# Patient Record
Sex: MALE | Race: WHITE | Employment: OTHER | URBAN - NONMETROPOLITAN AREA
[De-identification: names, ages, dates, MRNs, and addresses within clinical notes are randomized per-mention and may not be internally consistent; named-entity substitution may affect disease eponyms.]

---

## 2017-03-14 ENCOUNTER — OFFICE VISIT (OUTPATIENT)
Dept: CARDIOLOGY | Age: 72
End: 2017-03-14
Payer: MEDICARE

## 2017-03-14 VITALS
DIASTOLIC BLOOD PRESSURE: 70 MMHG | HEIGHT: 72 IN | BODY MASS INDEX: 28.44 KG/M2 | WEIGHT: 210 LBS | SYSTOLIC BLOOD PRESSURE: 138 MMHG | HEART RATE: 64 BPM

## 2017-03-14 DIAGNOSIS — I48.0 PAROXYSMAL ATRIAL FIBRILLATION (HCC): ICD-10-CM

## 2017-03-14 DIAGNOSIS — I49.5 SINOATRIAL NODE DYSFUNCTION (HCC): ICD-10-CM

## 2017-03-14 DIAGNOSIS — I48.0 PAF (PAROXYSMAL ATRIAL FIBRILLATION) (HCC): Primary | ICD-10-CM

## 2017-03-14 PROCEDURE — G8484 FLU IMMUNIZE NO ADMIN: HCPCS | Performed by: INTERNAL MEDICINE

## 2017-03-14 PROCEDURE — 1123F ACP DISCUSS/DSCN MKR DOCD: CPT | Performed by: INTERNAL MEDICINE

## 2017-03-14 PROCEDURE — 99212 OFFICE O/P EST SF 10 MIN: CPT | Performed by: INTERNAL MEDICINE

## 2017-03-14 PROCEDURE — G8420 CALC BMI NORM PARAMETERS: HCPCS | Performed by: INTERNAL MEDICINE

## 2017-03-14 PROCEDURE — 3017F COLORECTAL CA SCREEN DOC REV: CPT | Performed by: INTERNAL MEDICINE

## 2017-03-14 PROCEDURE — 4040F PNEUMOC VAC/ADMIN/RCVD: CPT | Performed by: INTERNAL MEDICINE

## 2017-03-14 PROCEDURE — 1036F TOBACCO NON-USER: CPT | Performed by: INTERNAL MEDICINE

## 2017-03-14 PROCEDURE — G8427 DOCREV CUR MEDS BY ELIG CLIN: HCPCS | Performed by: INTERNAL MEDICINE

## 2017-03-14 RX ORDER — RISPERIDONE 0.5 MG/1
0.5 TABLET, FILM COATED ORAL DAILY
COMMUNITY
Start: 2017-03-01 | End: 2018-03-21 | Stop reason: ALTCHOICE

## 2017-03-14 RX ORDER — CITALOPRAM 20 MG/1
20 TABLET ORAL DAILY
COMMUNITY
Start: 2017-03-01 | End: 2018-03-21 | Stop reason: ALTCHOICE

## 2017-05-17 ENCOUNTER — TELEPHONE (OUTPATIENT)
Dept: CARDIOLOGY | Age: 72
End: 2017-05-17

## 2017-06-13 DIAGNOSIS — I48.92 ATRIAL FLUTTER, UNSPECIFIED TYPE (HCC): Primary | ICD-10-CM

## 2017-06-13 RX ORDER — PROPAFENONE HYDROCHLORIDE 300 MG/1
300 TABLET, COATED ORAL EVERY 8 HOURS
Qty: 90 TABLET | Refills: 5 | Status: SHIPPED | OUTPATIENT
Start: 2017-06-13 | End: 2017-12-20 | Stop reason: SDUPTHER

## 2017-07-05 ENCOUNTER — TRANSCRIBE ORDERS (OUTPATIENT)
Dept: ADMINISTRATIVE | Facility: HOSPITAL | Age: 72
End: 2017-07-05

## 2017-07-05 ENCOUNTER — APPOINTMENT (OUTPATIENT)
Dept: LAB | Facility: HOSPITAL | Age: 72
End: 2017-07-05
Attending: FAMILY MEDICINE

## 2017-07-05 DIAGNOSIS — Z51.89 THERAPEUTIC: ICD-10-CM

## 2017-07-05 DIAGNOSIS — D69.1 ABNORMAL PLATELETS (HCC): Primary | ICD-10-CM

## 2017-07-05 LAB
DEPRECATED RDW RBC AUTO: 46.2 FL (ref 40–54)
ERYTHROCYTE [DISTWIDTH] IN BLOOD BY AUTOMATED COUNT: 13.4 % (ref 12–15)
HCT VFR BLD AUTO: 48.6 % (ref 40–52)
HGB BLD-MCNC: 16.5 G/DL (ref 14–18)
MCH RBC QN AUTO: 32 PG (ref 28–32)
MCHC RBC AUTO-ENTMCNC: 34 G/DL (ref 33–36)
MCV RBC AUTO: 94.2 FL (ref 82–95)
PLATELET # BLD AUTO: 172 10*3/MM3 (ref 130–400)
PMV BLD AUTO: 9.9 FL (ref 6–12)
POST-BLOOD PRESSURE: NORMAL
PRE-BLOOD PRESSURE: NORMAL
PRE-HCT: 48.6
PRE-HGB: 16.5
PULSE: 50
RBC # BLD AUTO: 5.16 10*6/MM3 (ref 4.8–5.9)
VOLUME COLLECTED: NORMAL
WBC NRBC COR # BLD: 6.01 10*3/MM3 (ref 4.8–10.8)

## 2017-07-05 PROCEDURE — 36415 COLL VENOUS BLD VENIPUNCTURE: CPT | Performed by: FAMILY MEDICINE

## 2017-07-05 PROCEDURE — 99195 PHLEBOTOMY: CPT | Performed by: FAMILY MEDICINE

## 2017-07-05 PROCEDURE — 85027 COMPLETE CBC AUTOMATED: CPT | Performed by: FAMILY MEDICINE

## 2017-09-20 ENCOUNTER — OFFICE VISIT (OUTPATIENT)
Dept: CARDIOLOGY | Age: 72
End: 2017-09-20
Payer: MEDICARE

## 2017-09-20 VITALS
DIASTOLIC BLOOD PRESSURE: 82 MMHG | HEART RATE: 50 BPM | BODY MASS INDEX: 28.89 KG/M2 | WEIGHT: 218 LBS | HEIGHT: 73 IN | SYSTOLIC BLOOD PRESSURE: 128 MMHG

## 2017-09-20 DIAGNOSIS — E78.00 HYPERCHOLESTEREMIA: ICD-10-CM

## 2017-09-20 DIAGNOSIS — I48.0 PAF (PAROXYSMAL ATRIAL FIBRILLATION) (HCC): ICD-10-CM

## 2017-09-20 DIAGNOSIS — I49.5 SINOATRIAL NODE DYSFUNCTION (HCC): ICD-10-CM

## 2017-09-20 DIAGNOSIS — I48.92 ATRIAL FLUTTER, UNSPECIFIED TYPE (HCC): Primary | ICD-10-CM

## 2017-09-20 PROCEDURE — G8427 DOCREV CUR MEDS BY ELIG CLIN: HCPCS | Performed by: INTERNAL MEDICINE

## 2017-09-20 PROCEDURE — 4040F PNEUMOC VAC/ADMIN/RCVD: CPT | Performed by: INTERNAL MEDICINE

## 2017-09-20 PROCEDURE — 99213 OFFICE O/P EST LOW 20 MIN: CPT | Performed by: INTERNAL MEDICINE

## 2017-09-20 PROCEDURE — G8419 CALC BMI OUT NRM PARAM NOF/U: HCPCS | Performed by: INTERNAL MEDICINE

## 2017-09-20 PROCEDURE — 3017F COLORECTAL CA SCREEN DOC REV: CPT | Performed by: INTERNAL MEDICINE

## 2017-09-20 PROCEDURE — 1036F TOBACCO NON-USER: CPT | Performed by: INTERNAL MEDICINE

## 2017-09-20 PROCEDURE — 1123F ACP DISCUSS/DSCN MKR DOCD: CPT | Performed by: INTERNAL MEDICINE

## 2017-12-20 ENCOUNTER — TELEPHONE (OUTPATIENT)
Dept: CARDIOLOGY | Age: 72
End: 2017-12-20

## 2017-12-20 DIAGNOSIS — I48.92 ATRIAL FLUTTER, UNSPECIFIED TYPE (HCC): ICD-10-CM

## 2017-12-20 RX ORDER — PROPAFENONE HYDROCHLORIDE 300 MG/1
300 TABLET, COATED ORAL EVERY 8 HOURS
Qty: 90 TABLET | Refills: 5 | Status: SHIPPED | OUTPATIENT
Start: 2017-12-20 | End: 2018-05-21 | Stop reason: SDUPTHER

## 2018-01-16 DIAGNOSIS — I48.92 ATRIAL FLUTTER, UNSPECIFIED TYPE (HCC): Primary | ICD-10-CM

## 2018-03-21 ENCOUNTER — OFFICE VISIT (OUTPATIENT)
Dept: CARDIOLOGY | Age: 73
End: 2018-03-21
Payer: MEDICARE

## 2018-03-21 VITALS
HEIGHT: 73 IN | DIASTOLIC BLOOD PRESSURE: 72 MMHG | HEART RATE: 56 BPM | SYSTOLIC BLOOD PRESSURE: 128 MMHG | BODY MASS INDEX: 28.23 KG/M2 | WEIGHT: 213 LBS

## 2018-03-21 DIAGNOSIS — I48.0 PAF (PAROXYSMAL ATRIAL FIBRILLATION) (HCC): ICD-10-CM

## 2018-03-21 DIAGNOSIS — I48.92 ATRIAL FLUTTER, UNSPECIFIED TYPE (HCC): Primary | ICD-10-CM

## 2018-03-21 DIAGNOSIS — I49.5 SINOATRIAL NODE DYSFUNCTION (HCC): ICD-10-CM

## 2018-03-21 DIAGNOSIS — E78.00 HYPERCHOLESTEREMIA: ICD-10-CM

## 2018-03-21 PROCEDURE — G8484 FLU IMMUNIZE NO ADMIN: HCPCS | Performed by: INTERNAL MEDICINE

## 2018-03-21 PROCEDURE — G8427 DOCREV CUR MEDS BY ELIG CLIN: HCPCS | Performed by: INTERNAL MEDICINE

## 2018-03-21 PROCEDURE — 1123F ACP DISCUSS/DSCN MKR DOCD: CPT | Performed by: INTERNAL MEDICINE

## 2018-03-21 PROCEDURE — 3017F COLORECTAL CA SCREEN DOC REV: CPT | Performed by: INTERNAL MEDICINE

## 2018-03-21 PROCEDURE — G8419 CALC BMI OUT NRM PARAM NOF/U: HCPCS | Performed by: INTERNAL MEDICINE

## 2018-03-21 PROCEDURE — 4040F PNEUMOC VAC/ADMIN/RCVD: CPT | Performed by: INTERNAL MEDICINE

## 2018-03-21 PROCEDURE — 1036F TOBACCO NON-USER: CPT | Performed by: INTERNAL MEDICINE

## 2018-03-21 PROCEDURE — 99212 OFFICE O/P EST SF 10 MIN: CPT | Performed by: INTERNAL MEDICINE

## 2018-03-21 RX ORDER — LISINOPRIL 5 MG/1
5 TABLET ORAL DAILY
COMMUNITY
End: 2020-07-21

## 2018-05-21 DIAGNOSIS — I48.92 ATRIAL FLUTTER, UNSPECIFIED TYPE (HCC): ICD-10-CM

## 2018-05-21 RX ORDER — PROPAFENONE HYDROCHLORIDE 300 MG/1
300 TABLET, COATED ORAL EVERY 8 HOURS
Qty: 90 TABLET | Refills: 5 | Status: SHIPPED | OUTPATIENT
Start: 2018-05-21 | End: 2018-12-28 | Stop reason: SDUPTHER

## 2018-07-13 ENCOUNTER — TRANSCRIBE ORDERS (OUTPATIENT)
Dept: ADMINISTRATIVE | Facility: HOSPITAL | Age: 73
End: 2018-07-13

## 2018-07-13 DIAGNOSIS — Z13.6 ENCOUNTER FOR SCREENING FOR VASCULAR DISEASE: Primary | ICD-10-CM

## 2018-08-30 ENCOUNTER — HOSPITAL ENCOUNTER (OUTPATIENT)
Dept: ULTRASOUND IMAGING | Facility: HOSPITAL | Age: 73
Discharge: HOME | End: 2018-08-30
Admitting: FAMILY MEDICINE

## 2018-08-30 DIAGNOSIS — Z13.6 ENCOUNTER FOR SCREENING FOR VASCULAR DISEASE: ICD-10-CM

## 2018-08-30 PROCEDURE — 93799 UNLISTED CV SVC/PROCEDURE: CPT

## 2018-09-17 ENCOUNTER — TELEPHONE (OUTPATIENT)
Dept: CARDIOLOGY | Age: 73
End: 2018-09-17

## 2018-09-17 NOTE — TELEPHONE ENCOUNTER
MELISSA no longer seeing Pt's at [facility]. Called Pt and left message about appt on 9/26 needing to be rescheduled.  Pt will need to be rescheduled with Vern Son at [address] on 10/24

## 2018-10-26 ENCOUNTER — OFFICE VISIT (OUTPATIENT)
Dept: CARDIOLOGY | Age: 73
End: 2018-10-26
Payer: MEDICARE

## 2018-10-26 VITALS
HEIGHT: 73 IN | DIASTOLIC BLOOD PRESSURE: 64 MMHG | BODY MASS INDEX: 28.36 KG/M2 | SYSTOLIC BLOOD PRESSURE: 102 MMHG | WEIGHT: 214 LBS | HEART RATE: 48 BPM

## 2018-10-26 DIAGNOSIS — I25.10 CORONARY ARTERY DISEASE INVOLVING NATIVE CORONARY ARTERY OF NATIVE HEART WITHOUT ANGINA PECTORIS: ICD-10-CM

## 2018-10-26 DIAGNOSIS — I49.5 SINOATRIAL NODE DYSFUNCTION (HCC): ICD-10-CM

## 2018-10-26 DIAGNOSIS — R94.31 ABNORMAL EKG: Primary | ICD-10-CM

## 2018-10-26 DIAGNOSIS — I48.92 ATRIAL FLUTTER, UNSPECIFIED TYPE (HCC): ICD-10-CM

## 2018-10-26 DIAGNOSIS — E78.00 HYPERCHOLESTEREMIA: ICD-10-CM

## 2018-10-26 PROCEDURE — 4040F PNEUMOC VAC/ADMIN/RCVD: CPT | Performed by: NURSE PRACTITIONER

## 2018-10-26 PROCEDURE — G8598 ASA/ANTIPLAT THER USED: HCPCS | Performed by: NURSE PRACTITIONER

## 2018-10-26 PROCEDURE — 99213 OFFICE O/P EST LOW 20 MIN: CPT | Performed by: NURSE PRACTITIONER

## 2018-10-26 PROCEDURE — 93000 ELECTROCARDIOGRAM COMPLETE: CPT | Performed by: NURSE PRACTITIONER

## 2018-10-26 PROCEDURE — 1036F TOBACCO NON-USER: CPT | Performed by: NURSE PRACTITIONER

## 2018-10-26 PROCEDURE — 1123F ACP DISCUSS/DSCN MKR DOCD: CPT | Performed by: NURSE PRACTITIONER

## 2018-10-26 PROCEDURE — 1101F PT FALLS ASSESS-DOCD LE1/YR: CPT | Performed by: NURSE PRACTITIONER

## 2018-10-26 PROCEDURE — 3017F COLORECTAL CA SCREEN DOC REV: CPT | Performed by: NURSE PRACTITIONER

## 2018-10-26 PROCEDURE — G8427 DOCREV CUR MEDS BY ELIG CLIN: HCPCS | Performed by: NURSE PRACTITIONER

## 2018-10-26 PROCEDURE — G8484 FLU IMMUNIZE NO ADMIN: HCPCS | Performed by: NURSE PRACTITIONER

## 2018-10-26 PROCEDURE — G8419 CALC BMI OUT NRM PARAM NOF/U: HCPCS | Performed by: NURSE PRACTITIONER

## 2018-10-26 RX ORDER — LEVOTHYROXINE SODIUM 0.03 MG/1
25 TABLET ORAL DAILY
COMMUNITY
End: 2018-12-07 | Stop reason: DRUGHIGH

## 2018-10-26 RX ORDER — RISPERIDONE 0.5 MG/1
0.5 TABLET, FILM COATED ORAL EVERY EVENING
COMMUNITY

## 2018-10-26 RX ORDER — CITALOPRAM 20 MG/1
20 TABLET ORAL DAILY
COMMUNITY
End: 2019-05-03 | Stop reason: DRUGHIGH

## 2018-10-26 RX ORDER — FENOFIBRATE 160 MG/1
160 TABLET ORAL DAILY
COMMUNITY

## 2018-10-26 NOTE — PROGRESS NOTES
auscultation bilaterally without evidence of respiratory distress. He without wheezes. He without rales or ronchi. Musculoskeletal: Normal range of motion. Gait is normal no assitive device. Neurological: He is alert and oriented to person, place, and time. Skin: Skin is warm and dry without rash or pallor. Psychiatric: He has a normal mood and affect. His behavior is normal. Thought content normal.     Lab Results   Component Value Date    CREATININE 1.4 06/17/2016    CREATININE 1.3 09/11/2015    CREATININE 1.4 05/19/2015    HGB 15.6 09/11/2015    HGB 17.6 02/19/2015       ECG 10/26/18  Sinus bradycardia, LBBB, 48 BPM    Assessment    1. Abnormal EKG    2. Hypercholesteremia    3. Sinoatrial node dysfunction (HCC)    4. Atrial flutter, unspecified type ([address]    5. Coronary artery disease involving native coronary artery of native heart without angina pectoris    6. Hypothyroidism / Goiter      Plan:    CAD-He is asymptomatic. He is able to meet METs. I have recommended he begin ASA 81 mg uses daily. I have discussed with the patient that he had diffuse CAD with 30% RCA stenosis on a cath from 2015 and has not been on ASA or statin and his EKG changes (compared to prior EKGs from 2016 and 2015) seem chronic. Difficult to tell as one was tachycardic with rates 124 and one was atrial fib at 110) I have therefore recommended he have a lexiscan nuclear stress test (has afib and LBBB). He has stated he will not have it done as he does not want to post pone his surgery on Monday. I have explained to he and his wife that the surgeon will be looking for our recommendations as they had reached out to us with concerns about his pre op EKG. Again, my recommendations are he has a lexiscan and that I would be putting this in my note to the surgeon. He understands. I advised I would be sending my note today and that I would also have Maryjane Genao, RN -risk stratification nurse reach out to their office.  I also advised

## 2018-11-30 ENCOUNTER — TELEPHONE (OUTPATIENT)
Dept: CARDIOLOGY | Age: 73
End: 2018-11-30

## 2018-12-07 ENCOUNTER — OFFICE VISIT (OUTPATIENT)
Dept: CARDIOLOGY | Age: 73
End: 2018-12-07
Payer: MEDICARE

## 2018-12-07 ENCOUNTER — TELEPHONE (OUTPATIENT)
Dept: CARDIOLOGY | Age: 73
End: 2018-12-07

## 2018-12-07 VITALS
HEIGHT: 73 IN | BODY MASS INDEX: 27.3 KG/M2 | DIASTOLIC BLOOD PRESSURE: 82 MMHG | SYSTOLIC BLOOD PRESSURE: 138 MMHG | HEART RATE: 58 BPM | WEIGHT: 206 LBS

## 2018-12-07 DIAGNOSIS — I49.5 SINOATRIAL NODE DYSFUNCTION (HCC): ICD-10-CM

## 2018-12-07 DIAGNOSIS — I25.10 CORONARY ARTERY DISEASE INVOLVING NATIVE CORONARY ARTERY OF NATIVE HEART WITHOUT ANGINA PECTORIS: ICD-10-CM

## 2018-12-07 DIAGNOSIS — I48.92 ATRIAL FLUTTER, UNSPECIFIED TYPE (HCC): Primary | ICD-10-CM

## 2018-12-07 DIAGNOSIS — E78.00 HYPERCHOLESTEREMIA: ICD-10-CM

## 2018-12-07 PROCEDURE — 93000 ELECTROCARDIOGRAM COMPLETE: CPT | Performed by: NURSE PRACTITIONER

## 2018-12-07 PROCEDURE — G8419 CALC BMI OUT NRM PARAM NOF/U: HCPCS | Performed by: NURSE PRACTITIONER

## 2018-12-07 PROCEDURE — G8427 DOCREV CUR MEDS BY ELIG CLIN: HCPCS | Performed by: NURSE PRACTITIONER

## 2018-12-07 PROCEDURE — G8484 FLU IMMUNIZE NO ADMIN: HCPCS | Performed by: NURSE PRACTITIONER

## 2018-12-07 PROCEDURE — 1036F TOBACCO NON-USER: CPT | Performed by: NURSE PRACTITIONER

## 2018-12-07 PROCEDURE — 4040F PNEUMOC VAC/ADMIN/RCVD: CPT | Performed by: NURSE PRACTITIONER

## 2018-12-07 PROCEDURE — 99213 OFFICE O/P EST LOW 20 MIN: CPT | Performed by: NURSE PRACTITIONER

## 2018-12-07 PROCEDURE — 3017F COLORECTAL CA SCREEN DOC REV: CPT | Performed by: NURSE PRACTITIONER

## 2018-12-07 PROCEDURE — 1101F PT FALLS ASSESS-DOCD LE1/YR: CPT | Performed by: NURSE PRACTITIONER

## 2018-12-07 PROCEDURE — 1123F ACP DISCUSS/DSCN MKR DOCD: CPT | Performed by: NURSE PRACTITIONER

## 2018-12-07 PROCEDURE — G8598 ASA/ANTIPLAT THER USED: HCPCS | Performed by: NURSE PRACTITIONER

## 2018-12-07 RX ORDER — LEVOTHYROXINE SODIUM 0.15 MG/1
150 TABLET ORAL DAILY
Refills: 1 | Status: ON HOLD | COMMUNITY
Start: 2018-11-27 | End: 2020-11-19

## 2018-12-28 DIAGNOSIS — I48.92 ATRIAL FLUTTER, UNSPECIFIED TYPE (HCC): ICD-10-CM

## 2018-12-28 RX ORDER — PROPAFENONE HYDROCHLORIDE 300 MG/1
TABLET, COATED ORAL
Qty: 90 TABLET | Refills: 5 | Status: SHIPPED | OUTPATIENT
Start: 2018-12-28 | End: 2019-07-15 | Stop reason: SDUPTHER

## 2019-01-24 RX ORDER — RIVAROXABAN 20 MG/1
TABLET, FILM COATED ORAL
Qty: 90 TABLET | Refills: 3 | Status: SHIPPED | OUTPATIENT
Start: 2019-01-24 | End: 2020-01-02

## 2019-05-03 ENCOUNTER — OFFICE VISIT (OUTPATIENT)
Dept: CARDIOLOGY | Age: 74
End: 2019-05-03
Payer: MEDICARE

## 2019-05-03 VITALS
BODY MASS INDEX: 28.89 KG/M2 | HEART RATE: 52 BPM | WEIGHT: 218 LBS | DIASTOLIC BLOOD PRESSURE: 86 MMHG | SYSTOLIC BLOOD PRESSURE: 130 MMHG | HEIGHT: 73 IN

## 2019-05-03 DIAGNOSIS — I25.10 CORONARY ARTERY DISEASE INVOLVING NATIVE CORONARY ARTERY OF NATIVE HEART WITHOUT ANGINA PECTORIS: ICD-10-CM

## 2019-05-03 DIAGNOSIS — I49.5 SINOATRIAL NODE DYSFUNCTION (HCC): ICD-10-CM

## 2019-05-03 DIAGNOSIS — I48.0 PAROXYSMAL ATRIAL FIBRILLATION (HCC): ICD-10-CM

## 2019-05-03 DIAGNOSIS — E78.00 HYPERCHOLESTEREMIA: Primary | ICD-10-CM

## 2019-05-03 PROCEDURE — G8427 DOCREV CUR MEDS BY ELIG CLIN: HCPCS | Performed by: NURSE PRACTITIONER

## 2019-05-03 PROCEDURE — G8419 CALC BMI OUT NRM PARAM NOF/U: HCPCS | Performed by: NURSE PRACTITIONER

## 2019-05-03 PROCEDURE — 1036F TOBACCO NON-USER: CPT | Performed by: NURSE PRACTITIONER

## 2019-05-03 PROCEDURE — 4040F PNEUMOC VAC/ADMIN/RCVD: CPT | Performed by: NURSE PRACTITIONER

## 2019-05-03 PROCEDURE — 99213 OFFICE O/P EST LOW 20 MIN: CPT | Performed by: NURSE PRACTITIONER

## 2019-05-03 PROCEDURE — 1123F ACP DISCUSS/DSCN MKR DOCD: CPT | Performed by: NURSE PRACTITIONER

## 2019-05-03 PROCEDURE — G8598 ASA/ANTIPLAT THER USED: HCPCS | Performed by: NURSE PRACTITIONER

## 2019-05-03 PROCEDURE — 3017F COLORECTAL CA SCREEN DOC REV: CPT | Performed by: NURSE PRACTITIONER

## 2019-05-03 PROCEDURE — 93000 ELECTROCARDIOGRAM COMPLETE: CPT | Performed by: NURSE PRACTITIONER

## 2019-05-03 RX ORDER — CITALOPRAM 40 MG/1
40 TABLET ORAL NIGHTLY
Refills: 1 | COMMUNITY
Start: 2019-03-05

## 2019-05-03 RX ORDER — AZELASTINE HCL 205.5 UG/1
2 SPRAY NASAL
COMMUNITY
End: 2019-11-07

## 2019-05-03 NOTE — PROGRESS NOTES
Dear Janel Flanagan Seen,    Thank you for allowing me to participate in the care of Mr. Rosetta Taylor. He presents today at the [address] in [place]. As you know, Mr. Carroll is a 68 y.o. male with history of hypothyroidism, CAD and PAF who presents with the chief complaint of 6 month follow up. He is a patient of Dr. Yessenia Christianson. CAD-last heart cath in 2015 with luminal irregularities throughout the entire coronary tree with 30% RCA stenosis. Offered Lexiscan prior to Thyroid surgery in October however patient refused. Surgery went well. EKG changes noted going back to 2016. PAF/ Sinoatrial node dysfunction-on Rythmol, Xarelto   3/12/2015  DCCV successful on lopressor 12.5 bid  05/04/2015  DCCV successful on rhythmol 150 tid  05/19/2015  DCCV successful on rhythmol 225 tid  6/16/2016  Recurrent atrial flutter  6/17/16  DCCV successful on rythmol 300 tid    He otherwise denies chest pain, SOA, BROWN, PND, orthopnea, syncope or near syncope. He has no other complaints. Review of Systems    Constitutional: Negative for fever, chills, diaphoresis, activity change, appetite change, fatigue and unexpected weight change. Eyes: Negative for photophobia, pain, redness and visual disturbance. Respiratory: Negative for apnea, cough, chest tightness, shortness of breath, wheezing and stridor. Cardiovascular: Negative for chest pain, palpitations and leg swelling. Gastrointestinal: Negative for abdominal distention. Genitourinary: Negative for dysuria, urgency and frequency. Musculoskeletal: Negative for myalgias, arthralgias and gait problem. Skin: Negative for color change, pallor, rash and wound. Neurological: Negative for dizziness, tremors, speech difficulty, weakness and numbness. Hematological: Does not bruise/bleed easily. Psychiatric/Behavioral: Negative.         Past Medical History:   Diagnosis Date    Bradycardia     CAD (coronary artery disease)     cath  diffuse disease through out the coronary tree, 30% RCA    Diverticulitis     Hernia, umbilical 3713    History of bleeding ulcers     Hypogonadism male 2009    Hypothyroidism     PAF (paroxysmal atrial fibrillation) ([facility] 5/4/15, 5/19/15    s/p cardioversion    Sinoatrial node dysfunction ([facility] [address] [facility] 2016    3/12/2015  DCCV successful on lopressor 12.5 bid 2015  DCCV successful on rhythmol 150 tid 2015  DCCV successful on rhythmol 225 tid 2016  Recurrent atrial flutter 16  DCCV successful on rythmol 300 tid          Past Surgical History:   Procedure Laterality Date    CARDIAC CATHETERIZATION  2/19/15  JDT    EF 50%    CARDIOVERSION  5/4/15, 5/19/15    CHOLECYSTECTOMY  2010    CYST REMOVAL  1948    DENTAL SURGERY  2004    HERNIA REPAIR      KNEE ARTHROPLASTY  2001    KNEE SURGERY  1963    THYROIDECTOMY  10/29/2018       Family History   Problem Relation Age of Onset    Heart Attack Father        Social History     Socioeconomic History    Marital status:      Spouse name: Not on file    Number of children: Not on file    Years of education: Not on file    Highest education level: Not on file   Occupational History    Not on file   Social Needs    Financial resource strain: Not on file    Food insecurity:     Worry: Not on file     Inability: Not on file    Transportation needs:     Medical: Not on file     Non-medical: Not on file   Tobacco Use    Smoking status: Former Smoker     Types: Cigarettes     Last attempt to quit: 1997     Years since quittin.7    Smokeless tobacco: Never Used   Substance and Sexual Activity    Alcohol use:  Yes     Alcohol/week: 0.0 oz     Comment: minimal    Drug use: No    Sexual activity: Not on file   Lifestyle    Physical activity:     Days per week: Not on file     Minutes per session: Not on file    Stress: Not on file   Relationships    Social connections:     Talks on phone: Not on file     Gets together: Not on file     Attends Quaker service: Not on file     Active member of club or organization: Not on file     Attends meetings of clubs or organizations: Not on file     Relationship status: Not on file    Intimate partner violence:     Fear of current or ex partner: Not on file     Emotionally abused: Not on file     Physically abused: Not on file     Forced sexual activity: Not on file   Other Topics Concern    Not on file   Social History Narrative    Not on file       Allergies   Allergen Reactions    Doxycycline     Levofloxacin     Septra [Sulfamethoxazole-Trimethoprim]     Sulfa Antibiotics Itching         Current Outpatient Medications:     citalopram (CELEXA) 40 MG tablet, Take 40 mg by mouth daily, Disp: , Rfl: 1    aspirin 81 MG tablet, Take 81 mg by mouth daily, Disp: , Rfl:     Loratadine (CLARITIN PO), Take by mouth, Disp: , Rfl:     azelastine HCl 0.15 % SOLN, 2 sprays by Nasal route, Disp: , Rfl:     XARELTO 20 MG TABS tablet, TAKE ONE TABLET DAILY WITH BREAKFAST, Disp: 90 tablet, Rfl: 3    propafenone (RYTHMOL) 300 MG tablet, TAKE ONE TABLET EVERY EIGHT HOURS, Disp: 90 tablet, Rfl: 5    levothyroxine (SYNTHROID) 150 MCG tablet, 150 mcg daily, Disp: , Rfl: 1    risperiDONE (RISPERDAL) 0.5 MG tablet, Take 0.5 mg by mouth every evening, Disp: , Rfl:     fenofibrate 160 MG tablet, Take 160 mg by mouth daily, Disp: , Rfl:     lisinopril (PRINIVIL;ZESTRIL) 5 MG tablet, Take 5 mg by mouth daily, Disp: , Rfl:     fluticasone (FLONASE) 50 MCG/ACT nasal spray, 1 spray by Nasal route daily, Disp: , Rfl:     Multiple Vitamins-Minerals (THERAPEUTIC MULTIVITAMIN-MINERALS) tablet, Take 1 tablet by mouth daily, Disp: , Rfl:     terazosin (HYTRIN) 1 MG capsule, Take 1 mg by mouth nightly , Disp: , Rfl:     TESTOSTERONE IM, Inject 100 mg into the muscle once a week , Disp: , Rfl:     PE:  Vitals:    05/03/19 0817   BP: 130/86   Pulse: 52       Estimated body mass index is 28.76 kg/m² as calculated from the following:    Height as of this encounter: 6' 1\" (1.854 m). Weight as of this encounter: 218 lb (98.9 kg). Constitutional: He is oriented to person, place, and time. He appears well-developed and well-nourished in no acute distress. Head: Normocephalic and atraumatic. Neck:  Neck supple without JVD present. Cardiovascular: Normal rate, regular rhythm, normal heart sounds. no murmur ascultated. No gallop and no friction rub.  no carotid bruits. no peripheral edema. Pulmonary/Chest:  Lungs clear to auscultation bilaterally without evidence of respiratory distress. He without wheezes. He without rales or ronchi. Musculoskeletal: Normal range of motion. Gait is normal no assitive device. Neurological: He is alert and oriented to person, place, and time. Skin: Skin is warm and dry without rash or pallor. Psychiatric: He has a normal mood and affect. His behavior is normal. Thought content normal.     Lab Results   Component Value Date    CREATININE 1.4 06/17/2016    CREATININE 1.3 09/11/2015    CREATININE 1.4 05/19/2015    HGB 15.6 09/11/2015    HGB 17.6 02/19/2015       ECG 05/03/19  Sinus Bradycardia, 51 BPM, Changes to V leads noted going back to 2016. Assessment    1. Hypercholesteremia    2. Sinoatrial node dysfunction (HCC)    3. Paroxysmal atrial fibrillation ([address]    4. Coronary artery disease involving native coronary artery of native heart without angina pectoris        Plan:    CAD-Continue ASA, fenofibrate,   PAF-Controlled on Rythmol and Xarelto. Continue current medications as prescribed. Stable Cardiovascular status      Disposition - RTC in 6-9 months or sooner if needed      Please do not hesitate to contact me for any questions or concerns.     Sincerely yours,    DYLON Colbert

## 2019-07-15 DIAGNOSIS — I48.92 ATRIAL FLUTTER, UNSPECIFIED TYPE (HCC): ICD-10-CM

## 2019-07-15 RX ORDER — PROPAFENONE HYDROCHLORIDE 300 MG/1
TABLET, COATED ORAL
Qty: 90 TABLET | Refills: 5 | Status: SHIPPED | OUTPATIENT
Start: 2019-07-15 | End: 2020-01-14 | Stop reason: SDUPTHER

## 2019-11-07 ENCOUNTER — OFFICE VISIT (OUTPATIENT)
Dept: OTOLARYNGOLOGY | Age: 74
End: 2019-11-07
Payer: MEDICARE

## 2019-11-07 VITALS
SYSTOLIC BLOOD PRESSURE: 130 MMHG | HEIGHT: 73 IN | TEMPERATURE: 97.4 F | BODY MASS INDEX: 29.03 KG/M2 | DIASTOLIC BLOOD PRESSURE: 78 MMHG | WEIGHT: 219 LBS

## 2019-11-07 DIAGNOSIS — H61.22 IMPACTED CERUMEN OF LEFT EAR: ICD-10-CM

## 2019-11-07 DIAGNOSIS — J31.0 CHRONIC RHINITIS: ICD-10-CM

## 2019-11-07 DIAGNOSIS — R04.0 EPISTAXIS: Primary | ICD-10-CM

## 2019-11-07 PROCEDURE — 99213 OFFICE O/P EST LOW 20 MIN: CPT | Performed by: NURSE PRACTITIONER

## 2019-11-07 RX ORDER — ECHINACEA PURPUREA EXTRACT 125 MG
2 TABLET ORAL PRN
Qty: 1 BOTTLE | Refills: 3 | Status: SHIPPED | OUTPATIENT
Start: 2019-11-07

## 2019-11-07 RX ORDER — OXYMETAZOLINE HYDROCHLORIDE 0.05 G/100ML
SPRAY NASAL
Qty: 1 BOTTLE | Refills: 3 | Status: SHIPPED | OUTPATIENT
Start: 2019-11-07

## 2019-11-07 ASSESSMENT — ENCOUNTER SYMPTOMS
RESPIRATORY NEGATIVE: 1
GASTROINTESTINAL NEGATIVE: 1
EYES NEGATIVE: 1

## 2020-01-02 ENCOUNTER — OFFICE VISIT (OUTPATIENT)
Dept: OTOLARYNGOLOGY | Age: 75
End: 2020-01-02
Payer: COMMERCIAL

## 2020-01-02 VITALS
SYSTOLIC BLOOD PRESSURE: 120 MMHG | BODY MASS INDEX: 29.42 KG/M2 | TEMPERATURE: 97.8 F | WEIGHT: 222 LBS | HEIGHT: 73 IN | DIASTOLIC BLOOD PRESSURE: 82 MMHG

## 2020-01-02 PROCEDURE — 1123F ACP DISCUSS/DSCN MKR DOCD: CPT | Performed by: NURSE PRACTITIONER

## 2020-01-02 PROCEDURE — G8417 CALC BMI ABV UP PARAM F/U: HCPCS | Performed by: NURSE PRACTITIONER

## 2020-01-02 PROCEDURE — 99213 OFFICE O/P EST LOW 20 MIN: CPT | Performed by: NURSE PRACTITIONER

## 2020-01-02 PROCEDURE — G8427 DOCREV CUR MEDS BY ELIG CLIN: HCPCS | Performed by: NURSE PRACTITIONER

## 2020-01-02 PROCEDURE — 4040F PNEUMOC VAC/ADMIN/RCVD: CPT | Performed by: NURSE PRACTITIONER

## 2020-01-02 PROCEDURE — G8484 FLU IMMUNIZE NO ADMIN: HCPCS | Performed by: NURSE PRACTITIONER

## 2020-01-02 PROCEDURE — 1036F TOBACCO NON-USER: CPT | Performed by: NURSE PRACTITIONER

## 2020-01-02 PROCEDURE — 3017F COLORECTAL CA SCREEN DOC REV: CPT | Performed by: NURSE PRACTITIONER

## 2020-01-02 RX ORDER — RIVAROXABAN 20 MG/1
TABLET, FILM COATED ORAL
Qty: 90 TABLET | Refills: 3 | Status: SHIPPED | OUTPATIENT
Start: 2020-01-02 | End: 2020-01-14 | Stop reason: SDUPTHER

## 2020-01-02 RX ORDER — ALCLOMETASONE DIPROPIONATE 0.5 MG/G
CREAM TOPICAL PRN
COMMUNITY
Start: 2019-11-27

## 2020-01-02 ASSESSMENT — ENCOUNTER SYMPTOMS
EYES NEGATIVE: 1
GASTROINTESTINAL NEGATIVE: 1
RESPIRATORY NEGATIVE: 1

## 2020-01-02 NOTE — PATIENT INSTRUCTIONS
Will obtain imaging studies, due to chronic anticoagulation he is not a typical surgical candidate. His allergic management was contributory to his epistaxis, therefore will evaluation imaging to determine if any other underlying issues are causing problems.     Call for problems or worsening symptoms

## 2020-01-02 NOTE — PROGRESS NOTES
Office Visit     Patient Care Team: Patient Care Team:  Cathy Valdez as PCP - General  Radha Roberts MD (Cardiology)  DYLON Thomas - CNP as Nurse Practitioner (Otolaryngology)  Surgery: No surgery found No surgery found    Chief Complaint:  Nasal Congestion      Jose Luis Hathaway is a 76 y.o. male who is here for follow up. These symptoms initially started 6 month(s). The patient symptoms have been are improving. The symptoms are aggravated by allergies, antihistamines, nasal sprays. The symptoms are alleviated by  ocean saline and bactroban. He continues to have nasal congestion nightly when he sits down. He is fine throughout the day but has nightly nasal congestion. He is only using Ocean and ointment, and occasionally uses the Afrin in severe situations. He denies further nosebleeds. DATA REVIEWED THIS VISIT:   None       This data has been reviewed by DYLON Koch and treatment implemented as necessary.            Past Medical History:   Diagnosis Date    Bradycardia     CAD (coronary artery disease)     cath 2015 diffuse disease through out the coronary tree, 30% RCA    Diverticulitis 2008    Hernia, umbilical 0475    History of bleeding ulcers 1972/1973    Hypogonadism male 2009    Hypothyroidism     PAF (paroxysmal atrial fibrillation) [address] [address] 5/4/15, 5/19/15    s/p cardioversion    Sinoatrial node dysfunction ([address] 6/17/2016    3/12/2015  DCCV successful on lopressor 12.5 bid 05/04/2015  DCCV successful on rhythmol 150 tid 05/19/2015  DCCV successful on rhythmol 225 tid 6/16/2016  Recurrent atrial flutter 6/17/16  DCCV successful on rythmol 300 tid             Past Surgical History:   Procedure Laterality Date    CARDIAC CATHETERIZATION  2/19/15  JDT    EF 50%    CARDIOVERSION  5/4/15, 5/19/15    CHOLECYSTECTOMY  01/26/2010    CYST REMOVAL  07/1948    DENTAL SURGERY  08/27/2004    HERNIA REPAIR  1948    KNEE ARTHROPLASTY  04/27/2001    KNEE SURGERY  12/1963    THYROIDECTOMY 10/29/2018          Allergies   Allergen Reactions    Doxycycline     Levofloxacin     Septra [Sulfamethoxazole-Trimethoprim]     Sulfa Antibiotics Itching          Family History   Problem Relation Age of Onset    Heart Attack Father           Social History     Socioeconomic History    Marital status:      Spouse name: None    Number of children: None    Years of education: None    Highest education level: None   Occupational History    None   Social Needs    Financial resource strain: None    Food insecurity:     Worry: None     Inability: None    Transportation needs:     Medical: None     Non-medical: None   Tobacco Use    Smoking status: Former Smoker     Types: Cigarettes     Last attempt to quit: 1997     Years since quittin.3    Smokeless tobacco: Never Used   Substance and Sexual Activity    Alcohol use:  Yes     Alcohol/week: 0.0 standard drinks     Comment: minimal    Drug use: No    Sexual activity: None   Lifestyle    Physical activity:     Days per week: None     Minutes per session: None    Stress: None   Relationships    Social connections:     Talks on phone: None     Gets together: None     Attends Mu-ism service: None     Active member of club or organization: None     Attends meetings of clubs or organizations: None     Relationship status: None    Intimate partner violence:     Fear of current or ex partner: None     Emotionally abused: None     Physically abused: None     Forced sexual activity: None   Other Topics Concern    None   Social History Narrative    None          Current Outpatient Medications   Medication Sig Dispense Refill    alclomethasone (ACLOVATE) 0.05 % cream       sodium chloride (OCEAN NASAL SPRAY) 0.65 % nasal spray 2 sprays by Nasal route as needed for Congestion 1 Bottle 3    oxymetazoline (AFRIN NASAL SPRAY) 0.05 % nasal spray Spray 2 sprays to affected nostril as needed for nosebleed 1 Bottle 3    propafenone (RYTHMOL) Future     Standing Expiration Date:   1/2/2021     Order Specific Question:   Reason for exam:     Answer:   turbinate hypertrophy, deviated septum, preop planning       No orders of the defined types were placed in this encounter. Patient Instructions   Will obtain imaging studies, due to chronic anticoagulation he is not a typical surgical candidate. His allergic management was contributory to his epistaxis, therefore will evaluation imaging to determine if any other underlying issues are causing problems. Call for problems or worsening symptoms          Return in about 4 weeks (around 1/30/2020) for to see Dr Ulysses Mckinnon to set up surgery.

## 2020-01-07 ENCOUNTER — HOSPITAL ENCOUNTER (OUTPATIENT)
Dept: CT IMAGING | Age: 75
Discharge: HOME | End: 2020-01-07
Payer: MEDICARE

## 2020-01-07 PROCEDURE — 70486 CT MAXILLOFACIAL W/O DYE: CPT

## 2020-01-08 ENCOUNTER — TELEPHONE (OUTPATIENT)
Dept: OTOLARYNGOLOGY | Age: 75
End: 2020-01-08

## 2020-01-08 NOTE — TELEPHONE ENCOUNTER
----- Message from Arma Spatz, APRN - CNP sent at 1/8/2020  9:44 AM CST -----  Sinuses are unremarkable, Trace mucosal thickening maxillary sinuses

## 2020-01-14 ENCOUNTER — OFFICE VISIT (OUTPATIENT)
Dept: CARDIOLOGY | Age: 75
End: 2020-01-14
Payer: MEDICARE

## 2020-01-14 VITALS
WEIGHT: 221 LBS | BODY MASS INDEX: 29.29 KG/M2 | HEART RATE: 56 BPM | HEIGHT: 73 IN | SYSTOLIC BLOOD PRESSURE: 138 MMHG | DIASTOLIC BLOOD PRESSURE: 84 MMHG

## 2020-01-14 PROCEDURE — G8417 CALC BMI ABV UP PARAM F/U: HCPCS | Performed by: INTERNAL MEDICINE

## 2020-01-14 PROCEDURE — 1036F TOBACCO NON-USER: CPT | Performed by: INTERNAL MEDICINE

## 2020-01-14 PROCEDURE — 4040F PNEUMOC VAC/ADMIN/RCVD: CPT | Performed by: INTERNAL MEDICINE

## 2020-01-14 PROCEDURE — G8427 DOCREV CUR MEDS BY ELIG CLIN: HCPCS | Performed by: INTERNAL MEDICINE

## 2020-01-14 PROCEDURE — 1123F ACP DISCUSS/DSCN MKR DOCD: CPT | Performed by: INTERNAL MEDICINE

## 2020-01-14 PROCEDURE — 99212 OFFICE O/P EST SF 10 MIN: CPT | Performed by: INTERNAL MEDICINE

## 2020-01-14 PROCEDURE — G8484 FLU IMMUNIZE NO ADMIN: HCPCS | Performed by: INTERNAL MEDICINE

## 2020-01-14 PROCEDURE — 3017F COLORECTAL CA SCREEN DOC REV: CPT | Performed by: INTERNAL MEDICINE

## 2020-01-14 RX ORDER — PROPAFENONE HYDROCHLORIDE 300 MG/1
TABLET, COATED ORAL
Qty: 270 TABLET | Refills: 3 | Status: SHIPPED | OUTPATIENT
Start: 2020-01-14 | End: 2021-01-27 | Stop reason: SDUPTHER

## 2020-01-14 NOTE — PROGRESS NOTES
to quit: 1997     Years since quittin.4    Smokeless tobacco: Never Used   Substance Use Topics    Alcohol use: Yes     Alcohol/week: 0.0 standard drinks     Comment: minimal          Review of Systems:    General:      Complaint / Symptom Yes / No / Description if Yes       Fatigue No   Weight gain N/A   Insomnia N/A       Respiratory:        Complaint / Symptom Yes / No / Description if Yes       Cough No   Horseness N/A       Cardiovascular:    Complaint / Symptom Yes / No / Description if Yes       Chest Pain No   Shortness of Air / Orthopnea No   Presyncope / Syncope No   Palpitations No         Objective:    /84   Pulse 56   Ht 6' 1\" (1.854 m)   Wt 221 lb (100.2 kg)   BMI 29.16 kg/m²     GENERAL - well developed and well nourished, conversant  HEENT -  PERRLA, Hearing appears normal  NECK - no thyromegaly, no JVD, trachea is in the midline  CARDIOVASCULAR - PMI is in the mid line clavicular position, Normal S1 and S2 with a grade 1/6 systolic murmur. No S3 or S4    PULMONARY - no respiratory distress. No wheezes or rales. Lungs are clear to ausculation   ABDOMEN  - soft, non tender, no rebound  MUSCULOSKELETAL  - range of motion of the upper and lower extermites appears normal and equal and is without pain   EXTREMITIES - no significant edema   NEUROLOGIC - gait and station are normal  SKIN - turgor is normal  PSYCHIATRIC - normal mood and affect, alert and orientated x 3,      ASSESSMENT:    ALL THE CARDIOLOGY PROBLEMS ARE LISTED ABOVE; HOWEVER, THE FOLLOWING SPECIFIC CARDIAC PROBLEMS / CONDITIONS WERE ADDRESSED AND TREATED DURING THE OFFICE VISIT TODAY:                                                                                            MEDICAL DECISION MAKING             Cardiac Specific Problem / Diagnosis  Discussion and Data Reviewed Diagnostic Procedures Ordered Management Options Selected           1. Aflutter  show no change   Review and summation of old records:     In

## 2020-01-28 ENCOUNTER — OFFICE VISIT (OUTPATIENT)
Dept: OTOLARYNGOLOGY | Age: 75
End: 2020-01-28
Payer: MEDICARE

## 2020-01-28 VITALS
DIASTOLIC BLOOD PRESSURE: 80 MMHG | BODY MASS INDEX: 29.31 KG/M2 | SYSTOLIC BLOOD PRESSURE: 130 MMHG | TEMPERATURE: 97.8 F | HEIGHT: 73 IN | WEIGHT: 221.13 LBS

## 2020-01-28 PROCEDURE — 4040F PNEUMOC VAC/ADMIN/RCVD: CPT | Performed by: NURSE PRACTITIONER

## 2020-01-28 PROCEDURE — G8417 CALC BMI ABV UP PARAM F/U: HCPCS | Performed by: NURSE PRACTITIONER

## 2020-01-28 PROCEDURE — 1036F TOBACCO NON-USER: CPT | Performed by: NURSE PRACTITIONER

## 2020-01-28 PROCEDURE — G8484 FLU IMMUNIZE NO ADMIN: HCPCS | Performed by: NURSE PRACTITIONER

## 2020-01-28 PROCEDURE — 3017F COLORECTAL CA SCREEN DOC REV: CPT | Performed by: NURSE PRACTITIONER

## 2020-01-28 PROCEDURE — G8427 DOCREV CUR MEDS BY ELIG CLIN: HCPCS | Performed by: NURSE PRACTITIONER

## 2020-01-28 PROCEDURE — 1123F ACP DISCUSS/DSCN MKR DOCD: CPT | Performed by: NURSE PRACTITIONER

## 2020-01-28 PROCEDURE — 99213 OFFICE O/P EST LOW 20 MIN: CPT | Performed by: NURSE PRACTITIONER

## 2020-01-28 RX ORDER — AMOXICILLIN 500 MG/1
500 CAPSULE ORAL 2 TIMES DAILY
Qty: 20 CAPSULE | Refills: 0 | Status: SHIPPED | OUTPATIENT
Start: 2020-01-28 | End: 2020-02-07

## 2020-01-28 RX ORDER — MONTELUKAST SODIUM 10 MG/1
10 TABLET ORAL DAILY
Qty: 30 TABLET | Refills: 3 | Status: SHIPPED | OUTPATIENT
Start: 2020-01-28 | End: 2020-11-06

## 2020-01-28 RX ORDER — PSEUDOEPHEDRINE HYDROCHLORIDE 30 MG/1
30 TABLET ORAL EVERY 4 HOURS PRN
COMMUNITY
End: 2020-07-21

## 2020-01-28 ASSESSMENT — ENCOUNTER SYMPTOMS
EYES NEGATIVE: 1
GASTROINTESTINAL NEGATIVE: 1
RESPIRATORY NEGATIVE: 1

## 2020-01-28 NOTE — PROGRESS NOTES
Office Visit     Patient Care Team: Patient Care Team:  Wayne Donovan as PCP - General  Julian Calvin MD (Cardiology)  DYLON Gonsalez - CNP as Nurse Practitioner (Otolaryngology)  Surgery: No surgery found No surgery found    Chief Complaint:  Sinus Problem      Mir Dumont is a 76 y.o. male who is here for follow up of nasal congestion. These symptoms initially started 1 year(s). The symptoms have been moderate in nature. The patient symptoms have been show no change. The symptoms are aggravated by laying down at night. The symptoms are alleviated by  nothing. He has stopped using his bactroban. CT scan of sinuses - normal paranasal sinuses, mild opacification of mastoid cells, small cyst in palate. He is hard of hearing yet denies ear pain or ear infection history. He has not been treated for fluid in ears. He denies otalgia, otorrhea, or sudden or recent change in his hearing. He is continuing to use Afrin nightly which helps his nasal congestion. He has stopped Claritin. DATA REVIEWED THIS VISIT:   CT      No radiation information found for this patient   Narrative   Examination: CT SINUS WO CONTRAST   Indication: J34.3   Comparison: None   Technique: Volumetric data were obtained from the level of the   ventricles to the level of the maxillary alveolus and reconstructed at   2.5 mm intervals in the axial, coronal and sagittal plane. Findings: There is no obstructive mucosal disease in bilateral maxillary   sinuses. There is no significant mucosal disease in the bilateral   ethmoid or frontal sinuses. The bilateral hiatus semilunaris are   clear. The bilateral nasofrontal recesses are patent. There is no   significant mucosal disease in the bilateral sphenoid sinuses. The   bilateral sphenoethmoidal recesses are patent. The nasal septum is deviated to the right. There is no discrete mass   within the nasal cavity. The intraorbital structures demonstrate no acute finding.    Mastoid air cells demonstrate moderate noncoalescent effusion on the   left. Asymmetric flattening of the left mandibular condyle, reflecting   degenerative changes. Incidental note is made of a 4 mm incisor canal stenosis/nasopalatine   duct cyst.       Impression   Impression:   Trace nonobstructive mucosal disease in the bilateral maxillary   sinuses. Signed by Dr Bam Salvador on 1/7/2020 2:49 PM          This data has been reviewed by DYLON Franklin and treatment implemented as necessary.            Past Medical History:   Diagnosis Date    Bradycardia     CAD (coronary artery disease)     cath 2015 diffuse disease through out the coronary tree, 30% RCA    Diverticulitis 2008    Hernia, umbilical 5643    History of bleeding ulcers 1972/1973    Hypogonadism male 2009    Hypothyroidism     PAF (paroxysmal atrial fibrillation) ([facility] [address] [address] 5/4/15, 5/19/15    s/p cardioversion    Sinoatrial node dysfunction ([facility] [address] 6/17/2016    3/12/2015  DCCV successful on lopressor 12.5 bid 05/04/2015  DCCV successful on rhythmol 150 tid 05/19/2015  DCCV successful on rhythmol 225 tid 6/16/2016  Recurrent atrial flutter 6/17/16  DCCV successful on rythmol 300 tid             Past Surgical History:   Procedure Laterality Date    CARDIAC CATHETERIZATION  2/19/15  JDT    EF 50%    CARDIOVERSION  5/4/15, 5/19/15    CHOLECYSTECTOMY  01/26/2010    CYST REMOVAL  07/1948    DENTAL SURGERY  08/27/2004    HERNIA REPAIR  1948    KNEE ARTHROPLASTY  04/27/2001    KNEE SURGERY  12/1963    THYROIDECTOMY  10/29/2018          Allergies   Allergen Reactions    Doxycycline     Levofloxacin     Septra [Sulfamethoxazole-Trimethoprim]     Sulfa Antibiotics Itching          Family History   Problem Relation Age of Onset    Heart Attack Father           Social History     Socioeconomic History    Marital status:      Spouse name: None    Number of children: None    Years of education: None    Highest education level: None nosebleed 1 Bottle 3    citalopram (CELEXA) 40 MG tablet Take 40 mg by mouth daily  1    aspirin 81 MG tablet Take 81 mg by mouth daily      levothyroxine (SYNTHROID) 150 MCG tablet 150 mcg daily  1    risperiDONE (RISPERDAL) 0.5 MG tablet Take 0.5 mg by mouth every evening      fenofibrate 160 MG tablet Take 160 mg by mouth daily      lisinopril (PRINIVIL;ZESTRIL) 5 MG tablet Take 5 mg by mouth daily      Multiple Vitamins-Minerals (THERAPEUTIC MULTIVITAMIN-MINERALS) tablet Take 1 tablet by mouth daily      terazosin (HYTRIN) 1 MG capsule Take 1 mg by mouth nightly       TESTOSTERONE IM Inject 100 mg into the muscle once a week       alclomethasone (ACLOVATE) 0.05 % cream Apply topically as needed       Loratadine (CLARITIN PO) Take by mouth       No current facility-administered medications for this visit. Review of Systems   Constitutional: Negative. HENT:        See HPI   Eyes: Negative. Respiratory: Negative. Cardiovascular: Negative. Gastrointestinal: Negative. Endocrine: Negative. Genitourinary: Negative. Musculoskeletal: Negative. Skin: Negative. Allergic/Immunologic: Positive for environmental allergies. Neurological: Negative. Hematological: Negative. Psychiatric/Behavioral: Negative.         Physical Exam  /80   Temp 97.8 °F (36.6 °C)   Ht 6' 1\" (1.854 m)   Wt 221 lb 2 oz (100.3 kg)   BMI 29.17 kg/m²     CONSTITUTIONAL:   well nourished, well-developed, alert, oriented, in no acute distress able to communicate normally, normal voice quality    HEAD & FACE: normocephalic, no lesions, atraumatic, no tenderness, no masses,appearance normal, no tenderness, no deformities, facial motion symmetric, parotid glands with no tenderness, no swelling, no masses, submandibular glands with normal size, nontender    EYES: ocular motility normal, eyelids normal, orbits normal, no proptosis, conjunctiva normal , pupils equal, round     EARS, NOSE & THROAT:  Hearing: hearing to conversational voice mildly impaired  responds whispered voice and finger rub    Ears:     Right Ear:     External: external ears normal     Otoscopy Ear Canal: canal clear     Otoscopy TM: TM's normal       Left Ear:     External: external ears normal     Otoscopy Ear Canal: canal clear     Otoscopy TM: TM's normal      Nose:    External nose is without deformity    Nose: nares normal and septum deviated  Left  mild    Nasal membranes are without lesions, vestibule within normal limits, turbinate with moderate hypertrophy    Oral:      Lips: normal upper and lower lips without lesion    Teeth: dentures    Oropharynx:normal Tonsils are normal to inspection without masses or lesions. Palate and uvula are normal.  Posterior pharynx without lesions or erythema. Oral         mucosa is moist without lesions. Tongue: normal tongue is normal to inspection without lesions, normal tongue mobility, lingual mucosa normal    Floor of mouth: Warthin ducts patent, mucosa normal       Laryngeal:      Hypopharynx: not examined     Larynx: not examined       NECK:     Inspection and Palpation: neck appearance normal, no masses or       Tenderness. Thyroid is normal without enlargement or nodules palpated. LYMPHATIC: No cervical lymphadenopathy noted. CHEST/RESPIRATORY: normal respiratory effort, no shortness of breath or stridor    CARDIOVASCULAR: no cyanosis or edema      NEUROLOGICAL/PSYCHIATRIC: oriented to time, place and person,       mood normal, affect appropriate, CN II-XII intact grossly intact      Assessment/ Plan:       Diagnosis Orders   1. Mastoid disorder, left     2. Allergic rhinitis, unspecified seasonality, unspecified trigger     3. Hypertrophy of nasal turbinates     4. Nasopalatine cyst     5. Deviated septum         No orders of the defined types were placed in this encounter.         Orders Placed This Encounter   Medications    amoxicillin (AMOXIL) 500 MG capsule

## 2020-01-28 NOTE — PATIENT INSTRUCTIONS
Dr Mortimer Mini has reviewed his images and no surgical intervention warranted at this time  Recommend to continue nasal moisture, avoidance of nasal steroids or nasal antihistamines due to history of nosebleeds  He is not a surgical candidate due to anticoagulation  Will treat left mastoid opacification with oral antibiotics and followup with audiogram  Call for problems or worsening symptoms

## 2020-02-25 ENCOUNTER — PROCEDURE VISIT (OUTPATIENT)
Dept: OTOLARYNGOLOGY | Age: 75
End: 2020-02-25
Payer: MEDICARE

## 2020-02-25 PROCEDURE — 92567 TYMPANOMETRY: CPT | Performed by: AUDIOLOGIST

## 2020-02-25 PROCEDURE — 92557 COMPREHENSIVE HEARING TEST: CPT | Performed by: AUDIOLOGIST

## 2020-06-25 ENCOUNTER — TELEPHONE (OUTPATIENT)
Dept: CARDIOLOGY | Age: 75
End: 2020-06-25

## 2020-06-26 NOTE — TELEPHONE ENCOUNTER
Patient called and advised he can no longer afford Xarelto, can we switch to something else?
Please let patient know that if we switch him to Coumadin, he will need to come in for regular INR testing. Okay to stop Xarelto and instead start Coumadin 5 mg daily.   INR in 1 week
Statement Selected

## 2020-06-29 RX ORDER — WARFARIN SODIUM 5 MG/1
5 TABLET ORAL DAILY
Qty: 30 TABLET | Refills: 3 | Status: SHIPPED | OUTPATIENT
Start: 2020-06-29 | End: 2021-02-16

## 2020-07-02 LAB — INR BLD: 1.3

## 2020-07-06 ENCOUNTER — ANTI-COAG VISIT (OUTPATIENT)
Dept: CARDIOLOGY | Age: 75
End: 2020-07-06

## 2020-07-13 ENCOUNTER — ANTI-COAG VISIT (OUTPATIENT)
Dept: CARDIOLOGY | Age: 75
End: 2020-07-13

## 2020-07-13 LAB — INR BLD: 4.2

## 2020-07-21 ENCOUNTER — OFFICE VISIT (OUTPATIENT)
Dept: CARDIOLOGY | Age: 75
End: 2020-07-21
Payer: MEDICARE

## 2020-07-21 VITALS
DIASTOLIC BLOOD PRESSURE: 68 MMHG | HEART RATE: 52 BPM | BODY MASS INDEX: 29.29 KG/M2 | HEIGHT: 73 IN | SYSTOLIC BLOOD PRESSURE: 122 MMHG | OXYGEN SATURATION: 96 % | WEIGHT: 221 LBS

## 2020-07-21 PROBLEM — I10 ESSENTIAL HYPERTENSION: Status: ACTIVE | Noted: 2020-07-21

## 2020-07-21 PROCEDURE — 4040F PNEUMOC VAC/ADMIN/RCVD: CPT | Performed by: NURSE PRACTITIONER

## 2020-07-21 PROCEDURE — G8427 DOCREV CUR MEDS BY ELIG CLIN: HCPCS | Performed by: NURSE PRACTITIONER

## 2020-07-21 PROCEDURE — 1123F ACP DISCUSS/DSCN MKR DOCD: CPT | Performed by: NURSE PRACTITIONER

## 2020-07-21 PROCEDURE — 1036F TOBACCO NON-USER: CPT | Performed by: NURSE PRACTITIONER

## 2020-07-21 PROCEDURE — 93000 ELECTROCARDIOGRAM COMPLETE: CPT | Performed by: NURSE PRACTITIONER

## 2020-07-21 PROCEDURE — 3017F COLORECTAL CA SCREEN DOC REV: CPT | Performed by: NURSE PRACTITIONER

## 2020-07-21 PROCEDURE — 99213 OFFICE O/P EST LOW 20 MIN: CPT | Performed by: NURSE PRACTITIONER

## 2020-07-21 PROCEDURE — G8417 CALC BMI ABV UP PARAM F/U: HCPCS | Performed by: NURSE PRACTITIONER

## 2020-07-21 RX ORDER — LISINOPRIL 10 MG/1
10 TABLET ORAL DAILY
COMMUNITY

## 2020-07-21 ASSESSMENT — ENCOUNTER SYMPTOMS
TROUBLE SWALLOWING: 0
COLOR CHANGE: 0
VOMITING: 0
WHEEZING: 0
ABDOMINAL PAIN: 0
NAUSEA: 0
EYE REDNESS: 0
SHORTNESS OF BREATH: 0
BLOOD IN STOOL: 0
ABDOMINAL DISTENTION: 0
FACIAL SWELLING: 0
EYE DISCHARGE: 0
COUGH: 0

## 2020-07-21 NOTE — PROGRESS NOTES
[address] [place] [address] Cardiology  [address]  [address]  Phone: [phone]  Fax: [phone]    OFFICE VISIT:  7/21/2020    [address] [place] [address] Avenue: 1945    Reason For Visit:  Es Quinones is a 76 y.o. male who is here for 6 Month Follow-Up (no cardiac symptoms) and Hyperlipidemia      HPI     [facility] is a 76 y.o. male with history of hypertension, hyperlipidemia, thyroidectomy, former nicotine dependence, a family history of CAD, and proximal atrial fibrillation who presents with the chief complaint of six-month follow-up. Es Quinones has no exertional chest pain, pressure, burning, tightness or squeezing. No symptomatic tachy- or quique-arrhythmia. No lightheadedness, dizziness, or syncope. No numbness or weakness to suggest cerebrovascular accident or transient ischemic attack. he denies signs of bleeding. Reports no edema or shortness of breath. He denies orthopnea or paroxysmal nocturnal dyspnea. he is sleeping on 1 pillow at night. he has been compliant with his medications. his BP has been controlled at home. he reports no activity change. PCP follows lipids and labs. Aman Valle MD is PCP.   Mercy Valenzuela has the following history as recorded in [facility]:    Patient Active Problem List    Diagnosis Date Noted    Essential hypertension 07/21/2020    Abnormal EKG 06/17/2016    Sinoatrial node dysfunction (HCC) 06/17/2016    Atrial flutter ([address]     Major depressive disorder, single episode, moderate ([address] 02/22/2016    Hypercholesteremia 08/22/2012     Past Medical History:   Diagnosis Date    Bradycardia     CAD (coronary artery disease)     cath 2015 diffuse disease through out the coronary tree, 30% RCA    Diverticulitis 2008    Hernia, umbilical 4007    History of bleeding ulcers 1972/1973    Hypogonadism male 2009    Hypothyroidism     PAF (paroxysmal atrial fibrillation) ([address] 5/4/15, 5/19/15    s/p cardioversion    Sinoatrial node dysfunction ([address] 2016    3/12/2015  DCCV successful on lopressor 12.5 bid 2015  DCCV successful on rhythmol 150 tid 2015  DCCV successful on rhythmol 225 tid 2016  Recurrent atrial flutter 16  DCCV successful on rythmol 300 tid        Past Surgical History:   Procedure Laterality Date    CARDIAC CATHETERIZATION  2/19/15  JDT    EF 50%    CARDIOVERSION  5/4/15, 5/19/15    CHOLECYSTECTOMY  2010    CYST REMOVAL      DENTAL SURGERY  2004    HERNIA REPAIR  1948    KNEE ARTHROPLASTY  2001    KNEE SURGERY  1963    THYROIDECTOMY  10/29/2018     Family History   Problem Relation Age of Onset    Heart Attack Father      Social History     Tobacco Use    Smoking status: Former Smoker     Types: Cigarettes     Last attempt to quit: 1997     Years since quittin.9    Smokeless tobacco: Never Used   Substance Use Topics    Alcohol use:  Yes     Alcohol/week: 0.0 standard drinks     Comment: minimal      Current Outpatient Medications   Medication Sig Dispense Refill    lisinopril (PRINIVIL;ZESTRIL) 10 MG tablet Take 10 mg by mouth daily      warfarin (COUMADIN) 5 MG tablet Take 1 tablet by mouth daily 30 tablet 3    montelukast (SINGULAIR) 10 MG tablet Take 1 tablet by mouth daily 30 tablet 3    BIOTIN PO Take 1 tablet by mouth daily      propafenone (RYTHMOL) 300 MG tablet TAKE ONE TABLET BY MOUTH EVERY 8 HOURS. 270 tablet 3    alclomethasone (ACLOVATE) 0.05 % cream Apply topically as needed       sodium chloride (OCEAN NASAL SPRAY) 0.65 % nasal spray 2 sprays by Nasal route as needed for Congestion 1 Bottle 3    oxymetazoline (AFRIN NASAL SPRAY) 0.05 % nasal spray Spray 2 sprays to affected nostril as needed for nosebleed 1 Bottle 3    citalopram (CELEXA) 40 MG tablet Take 40 mg by mouth daily  1    aspirin 81 MG tablet Take 81 mg by mouth daily      levothyroxine (SYNTHROID) 150 MCG tablet 150 mcg daily  1    risperiDONE (RISPERDAL) 0.5 MG tablet Take 0.5 mg by mouth every evening      fenofibrate 160 MG tablet Take 160 mg by mouth daily      Multiple Vitamins-Minerals (THERAPEUTIC MULTIVITAMIN-MINERALS) tablet Take 1 tablet by mouth daily      terazosin (HYTRIN) 1 MG capsule Take 1 mg by mouth nightly       TESTOSTERONE IM Inject 100 mg into the muscle once a week        No current facility-administered medications for this visit. Allergies: Doxycycline; Levofloxacin; Septra [sulfamethoxazole-trimethoprim]; and Sulfa antibiotics    Review of Systems  Review of Systems   Constitutional: Negative for activity change, diaphoresis, fatigue, fever and unexpected weight change. HENT: Negative for facial swelling, nosebleeds and trouble swallowing. Eyes: Negative for discharge, redness and visual disturbance. Respiratory: Negative for cough, shortness of breath and wheezing. Cardiovascular: Negative for chest pain, palpitations and leg swelling. Gastrointestinal: Negative for abdominal distention, abdominal pain, blood in stool, nausea and vomiting. Endocrine: Negative for cold intolerance and heat intolerance. Genitourinary: Negative for dysuria and hematuria. Musculoskeletal: Negative for gait problem. Skin: Negative for color change, pallor and rash. Neurological: Negative for dizziness, syncope, facial asymmetry and light-headedness. Hematological: Does not bruise/bleed easily. Psychiatric/Behavioral: Negative for behavioral problems and confusion. All other systems reviewed and are negative. Objective  Vital Signs - /68   Pulse 52   Ht 6' 1\" (1.854 m)   Wt 221 lb (100.2 kg)   SpO2 96%   BMI 29.16 kg/m²   Physical Exam  Vitals signs and nursing note reviewed. Constitutional:       Appearance: He is well-developed. HENT:      Head: Normocephalic and atraumatic. Right Ear: External ear normal.      Left Ear: External ear normal.      Nose: Nose normal.   Eyes:      General:         Right eye: No discharge. Left eye: No discharge. Pupils: Pupils are equal, round, and reactive to light. Neck:      Musculoskeletal: Normal range of motion. No edema. Vascular: No carotid bruit or JVD. Trachea: No tracheal deviation. Cardiovascular:      Rate and Rhythm: Regular rhythm. Bradycardia present. Heart sounds: Normal heart sounds. No murmur. No friction rub. No gallop. Pulmonary:      Effort: Pulmonary effort is normal. No respiratory distress. Breath sounds: No wheezing, rhonchi or rales. Abdominal:      General: Bowel sounds are normal. There is no distension. Palpations: Abdomen is soft. Tenderness: There is no abdominal tenderness. Musculoskeletal: Normal range of motion. Skin:     General: Skin is warm and dry. Capillary Refill: Capillary refill takes less than 2 seconds. Findings: No rash. Neurological:      Mental Status: He is alert and oriented to person, place, and time. Psychiatric:         Behavior: Behavior normal.         Judgment: Judgment normal.         Cardiac data:    ECG 07/21/20  Sinus bradycardia with left bundle branch block, nonspecific ST T wave abnormalities (unchanged from previous EKG)    QTc 0.442 ms    2/18/2015 TTE estimated EF 50%, physiologic pericardial effusion without obvious intracardiac mass  2/19/2015 cath luminary irregularities involving the entire coronary tree, 30% lesions in the RCA, small mid distal LAD without focal obstruction  3/12/2015 DCCV successful on Lopressor 12-1/2 twice daily  5/4/2015 DCCV successful on Rythmol 150, 3 times daily  5/19/2015 DCCV successful on Rythmol 225, 3 times daily  6/16/2016 recurrent atrial flutter  6/17/2016 DCCV successful on Rythmol 300, 3 times daily      Assessment, Recommendations, & Plan:  76 y.o. male with      Diagnosis Orders   1. Atrial flutter, unspecified type ([address]  EKG 12 lead   2. Hypercholesteremia     3. Essential hypertension         1.  History of atrial flutter/fib-in sinus rhythm today. Continues on Rythmol and Coumadin    2. Hypercholesterolemia-managed by PCP    3. Hypertension-blood pressure today 122/68, no changes made      Orders Placed This Encounter   Procedures    EKG 12 lead     Order Specific Question:   Reason for Exam?     Answer:   Irregular heart rate     Return in about 6 months (around 1/21/2021). Call with any questionsor concerns  Follow up with Milagro Alberto MD for non cardiac problems  Report any new problems  Cardiovascular Fitness-Exercise as tolerated. Strive for 15 minutes of exercise most days of the week. Cardiac / HealthyDiet  Continue current medications as directed  Continue plan of treatment  It is always recommended that you bring your medicationsbottles with you to each visit - this is for your safety! Please do not hesitate to contact me for any questions or concerns. Sincerely yours,    DYLON Levy    This dictation was generated by voice recognition computer software. Although all attempts are made to edit dictation for accuracy, there may be errors in the transcription that are not intended.

## 2020-07-23 ENCOUNTER — ANTI-COAG VISIT (OUTPATIENT)
Dept: CARDIOLOGY | Age: 75
End: 2020-07-23

## 2020-07-23 LAB — INR BLD: 4.9

## 2020-07-30 LAB — INR BLD: 2.9

## 2020-07-31 ENCOUNTER — ANTI-COAG VISIT (OUTPATIENT)
Dept: CARDIOLOGY | Age: 75
End: 2020-07-31

## 2020-08-13 ENCOUNTER — ANTI-COAG VISIT (OUTPATIENT)
Dept: CARDIOLOGY | Age: 75
End: 2020-08-13

## 2020-08-13 LAB — INR BLD: 2.8

## 2020-09-17 ENCOUNTER — TELEPHONE (OUTPATIENT)
Dept: CARDIOLOGY | Age: 75
End: 2020-09-17

## 2020-10-13 ENCOUNTER — ANTI-COAG VISIT (OUTPATIENT)
Dept: CARDIOLOGY | Age: 75
End: 2020-10-13

## 2020-10-13 LAB — INR BLD: 2.1

## 2020-11-06 ENCOUNTER — OFFICE VISIT (OUTPATIENT)
Dept: CARDIOLOGY | Age: 75
End: 2020-11-06
Payer: MEDICARE

## 2020-11-06 ENCOUNTER — TELEPHONE (OUTPATIENT)
Dept: CARDIOLOGY | Age: 75
End: 2020-11-06

## 2020-11-06 VITALS
HEART RATE: 62 BPM | WEIGHT: 217 LBS | DIASTOLIC BLOOD PRESSURE: 86 MMHG | SYSTOLIC BLOOD PRESSURE: 134 MMHG | BODY MASS INDEX: 28.76 KG/M2 | HEIGHT: 73 IN

## 2020-11-06 PROCEDURE — 99204 OFFICE O/P NEW MOD 45 MIN: CPT | Performed by: INTERNAL MEDICINE

## 2020-11-06 ASSESSMENT — ENCOUNTER SYMPTOMS
EYE REDNESS: 0
COUGH: 0
NAUSEA: 0
WHEEZING: 0
TROUBLE SWALLOWING: 0
VOMITING: 0
ABDOMINAL DISTENTION: 0
ABDOMINAL PAIN: 0
BLOOD IN STOOL: 0
FACIAL SWELLING: 0
COLOR CHANGE: 0
SHORTNESS OF BREATH: 0
EYE DISCHARGE: 0

## 2020-11-06 NOTE — TELEPHONE ENCOUNTER
Called and spoke with patient, have NELSON scheduled for 11/19/20 at 900 with arrival of 700. Patient is to be NPO after midnight. Patient instructed to arrive through front entrance of hospital and make immediate left. Patient advised they can have one person with them but they both must wear a mask. Patient advised may take morning medications with sip of water. Also advised patient must have COVID testing completed on 11/16/20 anywhere from 800-1100 am at [facility]. Advised patient that they will be able to proceed with procedure as long as test results are negative. Patient made aware that if testing is not resulted evening prior to procedure that they may have to be rescheduled and possibly retested. Given instructions on where to go and to self quarantine between testing and procedure. Patient does not have IV dye allergy. Patient verbally understood.

## 2020-11-06 NOTE — PROGRESS NOTES
[facility])- Cardiology      OFFICE VISIT:  2020    Mina Spring - : 1945    Reason For Visit:  Ghislaine Castillo is a 76 y.o. male who is here for discussion regarding watchman procedure    HPI    Mr. Nyoka Bence is a 76 y.o. male with history of hypertension, hyperlipidemia, thyroidectomy, former nicotine dependence, a family history of CAD, and proximal atrial fibrillation who presents with the chief complaint of six-month follow-up. Ghislaine Castillo has history of multiple episodes of bleeding on Coumadin, him and his wife mentioned that he has bleeding from his nose multiple times especially in the morning after he woke up from sleep, they changing bedsheets almost every day despite therapeutic INR  Also has bleeding in both his legs from the skin with the slightest scratch or trauma    He is here to discuss the option for left it appendage closure as alternative to blood thinners    He has no exertional chest pain, pressure, burning, tightness or squeezing. No symptomatic tachy- or quique-arrhythmia. No lightheadedness, dizziness, or syncope. No numbness or weakness to suggest cerebrovascular accident or transient ischemic attack. he reports no edema or shortness of breath. He denies orthopnea or paroxysmal nocturnal dyspnea. he is sleeping on 1 pillow at night. he has been compliant with his medications. his BP has been controlled at home. he reports no activity change. PCP follows lipids and labs. Parish Crowley MD is PCP.   Mina Spring has the following history as recorded in [facility]:    Patient Active Problem List    Diagnosis Date Noted    Essential hypertension 2020    Abnormal EKG 2016    Sinoatrial node dysfunction ([address] 2016    Atrial flutter ([address]     Major depressive disorder, single episode, moderate ([address] 2016    Hypercholesteremia 2012     Past Medical History:   Diagnosis Date    Bradycardia     CAD (coronary artery disease)     cath 2015 diffuse disease through out the coronary tree, 30% RCA    Diverticulitis     Hernia, umbilical 3665    History of bleeding ulcers     Hypogonadism male     Hypothyroidism     PAF (paroxysmal [address] fibrillation) [address] 5/4/15, 5/19/15    s/p cardioversion    Sinoatrial node dysfunction ([facility] [address] 2016    3/12/2015  DCCV successful on lopressor 12.5 bid 2015  DCCV successful on rhythmol 150 tid 2015  DCCV successful on rhythmol 225 tid 2016  Recurrent atrial flutter 16  DCCV successful on rythmol 300 tid        Past Surgical History:   Procedure Laterality Date    CARDIAC CATHETERIZATION  2/19/15  JDT    EF 50%    CARDIOVERSION  5/4/15, 5/19/15    CHOLECYSTECTOMY  2010    CYST REMOVAL  1948    DENTAL SURGERY  2004    HERNIA REPAIR      KNEE ARTHROPLASTY  2001    KNEE SURGERY  1963    THYROIDECTOMY  10/29/2018     Family History   Problem Relation Age of Onset    Heart Attack Father      Social History     Tobacco Use    Smoking status: Former Smoker     Types: Cigarettes     Last attempt to quit: 1997     Years since quittin.2    Smokeless tobacco: Never Used   Substance Use Topics    Alcohol use:  Yes     Alcohol/week: 0.0 standard drinks     Comment: minimal      Current Outpatient Medications   Medication Sig Dispense Refill    lisinopril (PRINIVIL;ZESTRIL) 10 MG tablet Take 10 mg by mouth daily      warfarin (COUMADIN) 5 MG tablet Take 1 tablet by mouth daily 30 tablet 3    BIOTIN PO Take 1 tablet by mouth daily      propafenone (RYTHMOL) 300 MG tablet TAKE ONE TABLET BY MOUTH EVERY 8 HOURS. 270 tablet 3    alclomethasone (ACLOVATE) 0.05 % cream Apply topically as needed       sodium chloride (OCEAN NASAL SPRAY) 0.65 % nasal spray 2 sprays by Nasal route as needed for Congestion 1 Bottle 3    oxymetazoline (AFRIN NASAL SPRAY) 0.05 % nasal spray Spray 2 sprays to affected nostril as needed for nosebleed 1 Bottle 3    citalopram well-developed. HENT:      Head: Normocephalic and atraumatic. Right Ear: External ear normal.      Left Ear: External ear normal.      Nose: Nose normal.   Eyes:      General:         Right eye: No discharge. Left eye: No discharge. Pupils: Pupils are equal, round, and reactive to light. Neck:      Musculoskeletal: Normal range of motion. No edema. Vascular: No carotid bruit or JVD. Trachea: No tracheal deviation. Cardiovascular:      Rate and Rhythm: Regular rhythm. Bradycardia present. Heart sounds: Normal heart sounds. No murmur. No friction rub. No gallop. Pulmonary:      Effort: Pulmonary effort is normal. No respiratory distress. Breath sounds: No wheezing, rhonchi or rales. Abdominal:      General: Bowel sounds are normal. There is no distension. Palpations: Abdomen is soft. Tenderness: There is no abdominal tenderness. Musculoskeletal: Normal range of motion. Skin:     General: Skin is warm and dry. Capillary Refill: Capillary refill takes less than 2 seconds. Findings: No rash. Neurological:      Mental Status: He is alert and oriented to person, place, and time.    Psychiatric:         Behavior: Behavior normal.         Judgment: Judgment normal.         Cardiac data:    ECG 11/06/20  Sinus bradycardia with left bundle branch block, nonspecific ST T wave abnormalities (unchanged from previous EKG)    QTc 0.442 ms    2/18/2015 TTE estimated EF 50%, physiologic pericardial effusion without obvious intracardiac mass  2/19/2015 cath luminary irregularities involving the entire coronary tree, 30% lesions in the RCA, small mid distal LAD without focal obstruction  3/12/2015 DCCV successful on Lopressor 12-1/2 twice daily  5/4/2015 DCCV successful on Rythmol 150, 3 times daily  5/19/2015 DCCV successful on Rythmol 225, 3 times daily  6/16/2016 recurrent atrial flutter  6/17/2016 DCCV successful on Rythmol 300, 3 times daily    ------------------      Assessment, Recommendations, & Plan:  76 y.o. male with history of atrial fibrillation on anticoagulation, history of hypertension and hyperlipidemia    Due to multiple nosebleeds episodes and skin bleeding on the lower extremity, he came in today for consultation regarding for the appendage closure device using watchman    Atrial Fibrillation, [id number] = 3    We had a long discussion with the patient and myself regarding the risks/benefits of stroke prophylaxis and anticoagulation using ACC guidelines and risk calculators. We discussed the options including no prophylaxis, aspirin only, DOACs, Warfarin, and mechanical occlusion of CHERRY (Watchman). The patient was able to comprehend their overall risk of stroke versus bleeding. The patient agreed that the patient was not a candidate for long-term anticoagulation due to the above issues. The patient is clinically a candidate for left atrial appendage occlusion using the Watchman device. Patient and family were informed of the risk/benefits of the procedure, the need for further imaging pre-procedure (NELSON/CTA) and post-procedure (NELSON) at specified intervals. Further, the patient understands that they will require anticoagulation before the procedure and after the procedure in the short-term (about 4-6 weeks). The patient is clinically a candidate for short-term anticoagulation. Further, discussion regarding the possibility of CVA related to other etiologies other than afib were also discussed including intracranial disease, carotid disease, cerebral vascular malformation, aortic atheroma, non-CHERRY cardioembolic source, etc.      Patient understood that the WATCHMAN device is designed to reduce the risk of CVA in the setting of afib only and does not reduce the risk of CVA related to any other cause of stroke.     All parties also understand that if post-procedure the patient develops another reason for anti-coagulation that this is a separate issue from the stroke prophylaxis for atrial fibrillation and that the device does not negate need for anticoagulation for other reasons. At this time, the only reason for anticoagulation is atrial fibrillation. All of the patient's/family's questions were answered to their satisfaction and they have decided to move forward with further work-up for the Watchman device. We will follow-up with the patient and primary cardiologist/caregivers throughout the process. Plan:  1) Pre-procedure imaging NELSON   2) Structural Heart Coordinator Dorian Riley RN to follow-up to ensure work-up completed  3) Watchman information/brochure given  4) Continue all anticoagulation as Rx'ed till instructed otherwise by the Structural Heart Team      Please do not hesitate to contact me for any questions or concerns. Sincerely yours,      Levada Brittle, MD, [facility]  Interventional Cardiologist, Endovascular Specialist   Medical Director, Structural Heart Program   [facility]    This dictation was generated by voice recognition computer software. Although all attempts are made to edit dictation for accuracy, there may be errors in the transcription that are not intended.

## 2020-11-10 ENCOUNTER — ANTI-COAG VISIT (OUTPATIENT)
Dept: CARDIOLOGY | Age: 75
End: 2020-11-10

## 2020-11-10 LAB — INR BLD: 2.8

## 2020-11-16 ENCOUNTER — OFFICE VISIT (OUTPATIENT)
Age: 75
End: 2020-11-16

## 2020-11-16 VITALS — HEART RATE: 53 BPM | OXYGEN SATURATION: 97 % | TEMPERATURE: 97.8 F

## 2020-11-18 ENCOUNTER — TELEPHONE (OUTPATIENT)
Dept: CARDIOLOGY | Age: 75
End: 2020-11-18

## 2020-11-18 NOTE — TELEPHONE ENCOUNTER
Called and spoke with patient, advised his COVID test was not back yet but we would continue to keep an eye for it. Advised patient's procedure may have to be postponed or r/s'd patient verbally understood.

## 2020-11-19 ENCOUNTER — HOSPITAL ENCOUNTER (OUTPATIENT)
Dept: CARDIAC CATH/INVASIVE PROCEDURES | Age: 75
Discharge: HOME | End: 2020-11-19
Attending: INTERNAL MEDICINE | Admitting: INTERNAL MEDICINE
Payer: MEDICARE

## 2020-11-19 VITALS
TEMPERATURE: 96.7 F | RESPIRATION RATE: 14 BRPM | HEART RATE: 51 BPM | WEIGHT: 216 LBS | SYSTOLIC BLOOD PRESSURE: 115 MMHG | DIASTOLIC BLOOD PRESSURE: 65 MMHG | HEIGHT: 73 IN | BODY MASS INDEX: 28.63 KG/M2 | OXYGEN SATURATION: 95 %

## 2020-11-19 LAB
EKG P AXIS: 72 DEGREES
EKG P-R INTERVAL: 200 MS
EKG Q-T INTERVAL: 492 MS
EKG QRS DURATION: 142 MS
EKG QTC CALCULATION (BAZETT): 466 MS
EKG T AXIS: 154 DEGREES
HCT VFR BLD CALC: 51.6 % (ref 42–52)
HEMOGLOBIN: 17 G/DL (ref 14–18)
INR BLD: 2.58 (ref 0.88–1.18)
LV EF: 58 %
LVEF MODALITY: NORMAL
MCH RBC QN AUTO: 31 PG (ref 27–31)
MCHC RBC AUTO-ENTMCNC: 32.9 G/DL (ref 33–37)
MCV RBC AUTO: 94 FL (ref 80–94)
PDW BLD-RTO: 14.6 % (ref 11.5–14.5)
PLATELET # BLD: 194 K/UL (ref 130–400)
PMV BLD AUTO: 9.9 FL (ref 9.4–12.4)
PROTHROMBIN TIME: 28.3 SEC (ref 12–14.6)
RBC # BLD: 5.49 M/UL (ref 4.7–6.1)
SARS-COV-2, NAA: NOT DETECTED
WBC # BLD: 6.1 K/UL (ref 4.8–10.8)

## 2020-11-19 PROCEDURE — 93321 DOPPLER ECHO F-UP/LMTD STD: CPT

## 2020-11-19 PROCEDURE — 93312 ECHO TRANSESOPHAGEAL: CPT

## 2020-11-19 PROCEDURE — 2500000003 HC RX 250 WO HCPCS

## 2020-11-19 PROCEDURE — 85027 COMPLETE CBC AUTOMATED: CPT

## 2020-11-19 PROCEDURE — 93005 ELECTROCARDIOGRAM TRACING: CPT | Performed by: INTERNAL MEDICINE

## 2020-11-19 PROCEDURE — 2580000003 HC RX 258: Performed by: INTERNAL MEDICINE

## 2020-11-19 PROCEDURE — 6370000000 HC RX 637 (ALT 250 FOR IP)

## 2020-11-19 PROCEDURE — 93325 DOPPLER ECHO COLOR FLOW MAPG: CPT

## 2020-11-19 PROCEDURE — 6360000002 HC RX W HCPCS

## 2020-11-19 PROCEDURE — 99152 MOD SED SAME PHYS/QHP 5/>YRS: CPT

## 2020-11-19 PROCEDURE — 36415 COLL VENOUS BLD VENIPUNCTURE: CPT

## 2020-11-19 PROCEDURE — 85610 PROTHROMBIN TIME: CPT

## 2020-11-19 RX ORDER — LEVOTHYROXINE SODIUM 175 UG/1
175 TABLET ORAL DAILY
COMMUNITY

## 2020-11-19 RX ORDER — SODIUM CHLORIDE 0.9 % (FLUSH) 0.9 %
10 SYRINGE (ML) INJECTION PRN
Status: DISCONTINUED | OUTPATIENT
Start: 2020-11-19 | End: 2020-11-19 | Stop reason: HOSPADM

## 2020-11-19 RX ORDER — SODIUM CHLORIDE 9 MG/ML
INJECTION, SOLUTION INTRAVENOUS CONTINUOUS
Status: DISCONTINUED | OUTPATIENT
Start: 2020-11-19 | End: 2020-11-19 | Stop reason: HOSPADM

## 2020-11-19 RX ORDER — SODIUM CHLORIDE 0.9 % (FLUSH) 0.9 %
10 SYRINGE (ML) INJECTION EVERY 12 HOURS SCHEDULED
Status: DISCONTINUED | OUTPATIENT
Start: 2020-11-19 | End: 2020-11-19 | Stop reason: HOSPADM

## 2020-11-19 RX ADMIN — SODIUM CHLORIDE: 9 INJECTION, SOLUTION INTRAVENOUS at 08:18

## 2020-11-19 ASSESSMENT — ENCOUNTER SYMPTOMS
BLOOD IN STOOL: 0
ABDOMINAL PAIN: 0
ABDOMINAL DISTENTION: 0
COUGH: 0
SHORTNESS OF BREATH: 0
WHEEZING: 0
VOMITING: 0
EYE REDNESS: 0
COLOR CHANGE: 0
EYE DISCHARGE: 0
TROUBLE SWALLOWING: 0
FACIAL SWELLING: 0
NAUSEA: 0

## 2020-11-19 NOTE — PROGRESS NOTES
Dr. Etta García, RN & Griffith Seip, ARNP here to see pt and talk with pt & wife regarding future plans for Watchman Procedure. Maurie Severe, RN to call pt when able to schedule procedure. Pt & wife voiced understandings.

## 2020-11-19 NOTE — PROGRESS NOTES
Gag reflex accessed. Pt able to swallow small amount then larger amount 7 up without difficulty. Lunch served.

## 2020-11-19 NOTE — PROGRESS NOTES
AVS explained & given. Pt & wife voiced understandings. Ambulated with pt to car for discharge home per wife. All belongings with pt.

## 2020-11-19 NOTE — H&P
[facility])- Cardiology      OFFICE VISIT:  2020    Kaylee Claude - : 1945    Reason For Visit:  Claude Martins is a 76 y.o. male who is here for discussion regarding watchman procedure    HPI    Mr. Crystal Vilchis is a 76 y.o. male with history of hypertension, hyperlipidemia, thyroidectomy, former nicotine dependence, a family history of CAD, and proximal atrial fibrillation who presents with the chief complaint of six-month follow-up. Claude Martins has history of multiple episodes of bleeding on Coumadin, him and his wife mentioned that he has bleeding from his nose multiple times especially in the morning after he woke up from sleep, they changing bedsheets almost every day despite therapeutic INR  Also has bleeding in both his legs from the skin with the slightest scratch or trauma    He is here to discuss the option for left it appendage closure as alternative to blood thinners    He has no exertional chest pain, pressure, burning, tightness or squeezing. No symptomatic tachy- or quique-arrhythmia. No lightheadedness, dizziness, or syncope. No numbness or weakness to suggest cerebrovascular accident or transient ischemic attack. he reports no edema or shortness of breath. He denies orthopnea or paroxysmal nocturnal dyspnea. he is sleeping on 1 pillow at night. he has been compliant with his medications. his BP has been controlled at home. he reports no activity change. PCP follows lipids and labs. Hill Joyner MD is PCP.   Kaylee Claude has the following history as recorded in [facility]:    Patient Active Problem List    Diagnosis Date Noted    Essential hypertension 2020    Abnormal EKG 2016    Sinoatrial node dysfunction ([address] 2016    Atrial flutter ([address]     Major depressive disorder, single episode, moderate ([address] 2016    Hypercholesteremia 2012     Past Medical History:   Diagnosis Date    Bradycardia     CAD (coronary artery disease)     cath 2015 diffuse disease through out the coronary tree, 30% RCA    Diverticulitis     Hernia, umbilical 7043    History of bleeding ulcers     Hyperlipidemia     Hypertension     Hypogonadism male 2009    Hypothyroidism     PAF (paroxysmal [address] fibrillation) ([facility] 5/4/15, 5/19/15    s/p cardioversion    Sinoatrial node dysfunction ([facility] 2016    3/12/2015  DCCV successful on lopressor 12.5 bid 2015  DCCV successful on rhythmol 150 tid 2015  DCCV successful on rhythmol 225 tid 2016  Recurrent atrial flutter 16  DCCV successful on rythmol 300 tid        Past Surgical History:   Procedure Laterality Date    CARDIAC CATHETERIZATION  2/19/15  JDT    EF 50%    CARDIOVERSION  5/4/15, 5/19/15    CHOLECYSTECTOMY  2010    CYST REMOVAL  1948    D[address]AL SURGERY  [address]    KNEE ARTHROPLASTY  2001    KNEE SURGERY  1963    MOUTH SURGERY  2020    THYROIDECTOMY  10/29/2018     Family History   Problem Relation Age of Onset    Heart Attack Father      Social History     Tobacco Use    Smoking status: Former Smoker     Types: Cigarettes     Last attempt to quit: 1997     Years since quittin.2    Smokeless tobacco: Never Used   Substance Use Topics    Alcohol use: Yes     Alcohol/week: 0.0 standard drinks     Comment: minimal      Current Facility-Administered Medications   Medication Dose Route Frequency Provider Last Rate Last Dose    0.9 % sodium chloride infusion   Intravenous Continuous Girish Fabian MD 75 mL/hr at 20 0818      sodium chloride flush 0.9 % injection 10 mL  10 mL Intravenous 2 times per day Girish Fabian MD        sodium chloride flush 0.9 % injection 10 mL  10 mL Intravenous PRN Girish Fabian MD         Allergies: Doxycycline; Levofloxacin;  Septra [sulfamethoxazole-trimethoprim]; and Sulfa antibiotics    Review of Systems  Review of Systems   Constitutional: Negative for activity change, diaphoresis, fatigue, fever and unexpected weight change. HENT: Positive for nosebleeds. Negative for facial swelling and trouble swallowing. Eyes: Negative for discharge, redness and visual disturbance. Respiratory: Negative for cough, shortness of breath and wheezing. Cardiovascular: Negative for chest pain, palpitations and leg swelling. Gastrointestinal: Negative for abdominal distention, abdominal pain, blood in stool, nausea and vomiting. Endocrine: Negative for cold intolerance and heat intolerance. Genitourinary: Negative for dysuria and hematuria. Musculoskeletal: Negative for gait problem. Skin: Negative for color change, pallor and rash. Neurological: Negative for dizziness, syncope, facial asymmetry and light-headedness. Hematological: Does not bruise/bleed easily. Psychiatric/Behavioral: Negative for behavioral problems and confusion. All other systems reviewed and are negative. Objective  Vital Signs - BP (!) 147/77   Pulse (!) 49   Temp 96.7 °F (35.9 °C) (Temporal)   Resp 15   Ht 6' 1\" (1.854 m)   Wt 216 lb (98 kg)   SpO2 99%   BMI 28.50 kg/m²   Physical Exam  Vitals signs and nursing note reviewed. Constitutional:       Appearance: He is well-developed. HENT:      Head: Normocephalic and atraumatic. Right Ear: External ear normal.      Left Ear: External ear normal.      Nose: Nose normal.   Eyes:      General:         Right eye: No discharge. Left eye: No discharge. Pupils: Pupils are equal, round, and reactive to light. Neck:      Musculoskeletal: Normal range of motion. No edema. Vascular: No carotid bruit or JVD. Trachea: No tracheal deviation. Cardiovascular:      Rate and Rhythm: Regular rhythm. Bradycardia present. Heart sounds: Normal heart sounds. No murmur. No friction rub. No gallop. Pulmonary:      Effort: Pulmonary effort is normal. No respiratory distress. Breath sounds:  No wheezing, rhonchi or rales.   Abdominal:      General: Bowel sounds are normal. There is no distension. Palpations: Abdomen is soft. Tenderness: There is no abdominal tenderness. Musculoskeletal: Normal range of motion. Skin:     General: Skin is warm and dry. Capillary Refill: Capillary refill takes less than 2 seconds. Findings: No rash. Neurological:      Mental Status: He is alert and oriented to person, place, and time. Psychiatric:         Behavior: Behavior normal.         Judgment: Judgment normal.         Cardiac data:    ECG 11/19/20  Sinus bradycardia with left bundle branch block, nonspecific ST T wave abnormalities (unchanged from previous EKG)    QTc 0.442 ms    2/18/2015 TTE estimated EF 50%, physiologic pericardial effusion without obvious intracardiac mass  2/19/2015 cath luminary irregularities involving the entire coronary tree, 30% lesions in the RCA, small mid distal LAD without focal obstruction  3/12/2015 DCCV successful on Lopressor 12-1/2 twice daily  5/4/2015 DCCV successful on Rythmol 150, 3 times daily  5/19/2015 DCCV successful on Rythmol 225, 3 times daily  6/16/2016 recurrent atrial flutter  6/17/2016 DCCV successful on Rythmol 300, 3 times daily    ------------------      Assessment, Recommendations, & Plan:  76 y.o. male with history of atrial fibrillation on anticoagulation, history of hypertension and hyperlipidemia    Due to multiple nosebleeds episodes and skin bleeding on the lower extremity, he came in today for consultation regarding for the appendage closure device using watchman    Atrial Fibrillation, [id number] = 3    We had a long discussion with the patient and myself regarding the risks/benefits of stroke prophylaxis and anticoagulation using ACC guidelines and risk calculators. We discussed the options including no prophylaxis, aspirin only, DOACs, Warfarin, and mechanical occlusion of CHERRY (Watchman).  The patient was able to comprehend their overall risk of stroke versus bleeding. The patient agreed that the patient was not a candidate for long-term anticoagulation due to the above issues. The patient is clinically a candidate for left atrial appendage occlusion using the Watchman device. Patient and family were informed of the risk/benefits of the procedure, the need for further imaging pre-procedure (NELSON/CTA) and post-procedure (NELSON) at specified intervals. Further, the patient understands that they will require anticoagulation before the procedure and after the procedure in the short-term (about 4-6 weeks). The patient is clinically a candidate for short-term anticoagulation. Further, discussion regarding the possibility of CVA related to other etiologies other than afib were also discussed including intracranial disease, carotid disease, cerebral vascular malformation, aortic atheroma, non-CHERRY cardioembolic source, etc.      Patient understood that the WATCHMAN device is designed to reduce the risk of CVA in the setting of afib only and does not reduce the risk of CVA related to any other cause of stroke. All parties also understand that if post-procedure the patient develops another reason for anti-coagulation that this is a separate issue from the stroke prophylaxis for atrial fibrillation and that the device does not negate need for anticoagulation for other reasons. At this time, the only reason for anticoagulation is atrial fibrillation. All of the patient's/family's questions were answered to their satisfaction and they have decided to move forward with further work-up for the Watchman device. We will follow-up with the patient and primary cardiologist/caregivers throughout the process.       Plan:  1) Pre-procedure imaging NELSON   2) Structural Heart Coordinator Sara Yan RN to follow-up to ensure work-up completed  3) Watchman information/brochure given  4) Continue all anticoagulation as Rx'ed till instructed otherwise by the Structural Heart Team      Please do not hesitate to contact me for any questions or concerns. Sincerely yours,      Julienne Da Silva MD, [facility], [facility]  Interventional Cardiologist, Endovascular Specialist   Medical Director, [facility]   [facility]    This dictation was generated by voice recognition computer software. Although all attempts are made to edit dictation for accuracy, there may be errors in the transcription that are not intended. Risks, benefits, alternatives of NELSON discussed with the patient   I explained the procedure to the patient in detail and fully informed consent obtained.   Acceptable Mallampati score  Consent for moderate conscious sedation  ASA 3      Julienne Da Silva MD, [facility], [address] , Endovascular Specialist   Medical Director, [facility]   [facility]

## 2020-11-21 PROCEDURE — 99152 MOD SED SAME PHYS/QHP 5/>YRS: CPT | Performed by: INTERNAL MEDICINE

## 2020-12-01 ENCOUNTER — TELEPHONE (OUTPATIENT)
Dept: CARDIOLOGY | Age: 75
End: 2020-12-01

## 2020-12-01 NOTE — TELEPHONE ENCOUNTER
Called and spoke with patient, sindhu Queen scheduled for 12/23/20 at 1200 with arrival of 1000. Patient is to be NPO after midnight. Patient instructed to arrive through front entrance of hospital and make immediate left. Patient advised they can have one person with them but they both must wear a mask. Patient advised may take morning medications with sip of water. Also advised patient must have COVID testing completed on 12/18/20 anywhere from 800-1100 am at [facility] patient that they will be able to proceed with procedure as long as test results are negative. Patient made aware that if testing is not resulted evening prior to procedure that they may have to be rescheduled and possibly retested. Given instructions on where to go and to self quarantine between testing and procedure. Patient does not have IV dye allergy. Patient verbally understood. Called and spoke to Josh Crespo in cath lab on 12/1/20 and scheduled procedure.

## 2020-12-02 LAB — INR BLD: 3.1

## 2020-12-03 ENCOUNTER — ANTI-COAG VISIT (OUTPATIENT)
Dept: CARDIOLOGY | Age: 75
End: 2020-12-03

## 2020-12-15 ENCOUNTER — TELEPHONE (OUTPATIENT)
Dept: CARDIOLOGY | Age: 75
End: 2020-12-15

## 2020-12-15 NOTE — TELEPHONE ENCOUNTER
Called and spoke with patient, sindhu Queen scheduled for 12/23/20 at 1200 with arrival of 1000. Patient is to be NPO after midnight. Patient instructed to arrive through front entrance of hospital and make immediate left. Patient advised they can have one person with them but they both must wear a mask. Patient advised may take morning medications with sip of water. Also advised patient must have COVID testing completed on 12/18/20 anywhere from 800-1100 am at [facility] patient that they will be able to proceed with procedure as long as test results are negative. Patient made aware that if testing is not resulted evening prior to procedure that they may have to be rescheduled and possibly retested. Given instructions on where to go and to self quarantine between testing and procedure. Patient does not have IV dye allergy. Patient verbally understood. Called and spoke to Eddy in cath lab on 12/15/20 and scheduled procedure.

## 2020-12-22 ENCOUNTER — PREP FOR PROCEDURE (OUTPATIENT)
Dept: CARDIOLOGY | Age: 75
End: 2020-12-22

## 2020-12-22 RX ORDER — SODIUM CHLORIDE 0.9 % (FLUSH) 0.9 %
10 SYRINGE (ML) INJECTION EVERY 12 HOURS SCHEDULED
Status: CANCELLED | OUTPATIENT
Start: 2020-12-22

## 2020-12-22 RX ORDER — SODIUM CHLORIDE 9 MG/ML
INJECTION, SOLUTION INTRAVENOUS CONTINUOUS
Status: CANCELLED | OUTPATIENT
Start: 2020-12-23

## 2020-12-22 RX ORDER — SODIUM CHLORIDE 0.9 % (FLUSH) 0.9 %
10 SYRINGE (ML) INJECTION PRN
Status: CANCELLED | OUTPATIENT
Start: 2020-12-22

## 2020-12-22 ASSESSMENT — ENCOUNTER SYMPTOMS
TROUBLE SWALLOWING: 0
SHORTNESS OF BREATH: 0
VOMITING: 0
EYE DISCHARGE: 0
FACIAL SWELLING: 0
COLOR CHANGE: 0
ABDOMINAL PAIN: 0
WHEEZING: 0
ABDOMINAL DISTENTION: 0
COUGH: 0
NAUSEA: 0
EYE REDNESS: 0
BLOOD IN STOOL: 0

## 2020-12-23 ENCOUNTER — ANESTHESIA EVENT (OUTPATIENT)
Dept: CARDIAC CATH/INVASIVE PROCEDURES | Age: 75
End: 2020-12-23
Payer: MEDICARE

## 2020-12-23 ENCOUNTER — ANESTHESIA (OUTPATIENT)
Dept: CARDIAC CATH/INVASIVE PROCEDURES | Age: 75
End: 2020-12-23
Payer: MEDICARE

## 2020-12-23 ENCOUNTER — HOSPITAL ENCOUNTER (OUTPATIENT)
Dept: CARDIAC CATH/INVASIVE PROCEDURES | Age: 75
Discharge: HOME | End: 2020-12-23
Attending: INTERNAL MEDICINE | Admitting: INTERNAL MEDICINE
Payer: MEDICARE

## 2020-12-23 VITALS
RESPIRATION RATE: 1 BRPM | SYSTOLIC BLOOD PRESSURE: 189 MMHG | TEMPERATURE: 97 F | OXYGEN SATURATION: 95 % | DIASTOLIC BLOOD PRESSURE: 88 MMHG

## 2020-12-23 VITALS
BODY MASS INDEX: 29.16 KG/M2 | HEART RATE: 60 BPM | DIASTOLIC BLOOD PRESSURE: 85 MMHG | WEIGHT: 220 LBS | SYSTOLIC BLOOD PRESSURE: 143 MMHG | TEMPERATURE: 98.7 F | RESPIRATION RATE: 16 BRPM | OXYGEN SATURATION: 95 % | HEIGHT: 73 IN

## 2020-12-23 LAB
ABO/RH: NORMAL
ABO/RH: NORMAL
ALBUMIN SERPL-MCNC: 4.4 G/DL (ref 3.5–5.2)
ALP BLD-CCNC: 36 U/L (ref 40–130)
ALT SERPL-CCNC: 15 U/L (ref 5–41)
ANION GAP SERPL CALCULATED.3IONS-SCNC: 10 MMOL/L (ref 7–19)
ANTIBODY IDENTIFICATION: NORMAL
ANTIBODY SCREEN: NORMAL
AST SERPL-CCNC: 19 U/L (ref 5–40)
BILIRUB SERPL-MCNC: 0.8 MG/DL (ref 0.2–1.2)
BUN BLDV-MCNC: 24 MG/DL (ref 8–23)
CALCIUM SERPL-MCNC: 9.2 MG/DL (ref 8.8–10.2)
CHLORIDE BLD-SCNC: 105 MMOL/L (ref 98–111)
CO2: 24 MMOL/L (ref 22–29)
CREAT SERPL-MCNC: 1.9 MG/DL (ref 0.5–1.2)
EKG P AXIS: 71 DEGREES
EKG P-R INTERVAL: 210 MS
EKG Q-T INTERVAL: 480 MS
EKG QRS DURATION: 148 MS
EKG QTC CALCULATION (BAZETT): 472 MS
EKG T AXIS: 162 DEGREES
GFR AFRICAN AMERICAN: 42
GFR NON-AFRICAN AMERICAN: 35
GLUCOSE BLD-MCNC: 91 MG/DL (ref 74–109)
HCT VFR BLD CALC: 53.3 % (ref 42–52)
HEMOGLOBIN: 17.1 G/DL (ref 14–18)
INR BLD: 1.25 (ref 0.88–1.18)
MCH RBC QN AUTO: 30.9 PG (ref 27–31)
MCHC RBC AUTO-ENTMCNC: 32.1 G/DL (ref 33–37)
MCV RBC AUTO: 96.4 FL (ref 80–94)
PDW BLD-RTO: 13.9 % (ref 11.5–14.5)
PLATELET # BLD: 205 K/UL (ref 130–400)
PMV BLD AUTO: 9.6 FL (ref 9.4–12.4)
POTASSIUM SERPL-SCNC: 4.3 MMOL/L (ref 3.5–5)
PROTHROMBIN TIME: 15.7 SEC (ref 12–14.6)
RBC # BLD: 5.53 M/UL (ref 4.7–6.1)
SODIUM BLD-SCNC: 139 MMOL/L (ref 136–145)
TOTAL PROTEIN: 7.2 G/DL (ref 6.6–8.7)
WBC # BLD: 6.8 K/UL (ref 4.8–10.8)

## 2020-12-23 PROCEDURE — 86905 BLOOD TYPING RBC ANTIGENS: CPT

## 2020-12-23 PROCEDURE — 85610 PROTHROMBIN TIME: CPT

## 2020-12-23 PROCEDURE — 7100000001 HC PACU RECOVERY - ADDTL 15 MIN

## 2020-12-23 PROCEDURE — 7100000000 HC PACU RECOVERY - FIRST 15 MIN

## 2020-12-23 PROCEDURE — 2580000003 HC RX 258: Performed by: INTERNAL MEDICINE

## 2020-12-23 PROCEDURE — 3700000001 HC ADD 15 MINUTES (ANESTHESIA)

## 2020-12-23 PROCEDURE — 93325 DOPPLER ECHO COLOR FLOW MAPG: CPT

## 2020-12-23 PROCEDURE — 3700000000 HC ANESTHESIA ATTENDED CARE

## 2020-12-23 PROCEDURE — 86900 BLOOD TYPING SEROLOGIC ABO: CPT

## 2020-12-23 PROCEDURE — 85027 COMPLETE CBC AUTOMATED: CPT

## 2020-12-23 PROCEDURE — 2709999900 HC NON-CHARGEABLE SUPPLY

## 2020-12-23 PROCEDURE — 86870 RBC ANTIBODY IDENTIFICATION: CPT

## 2020-12-23 PROCEDURE — 93312 ECHO TRANSESOPHAGEAL: CPT

## 2020-12-23 PROCEDURE — 6360000002 HC RX W HCPCS

## 2020-12-23 PROCEDURE — 2500000003 HC RX 250 WO HCPCS

## 2020-12-23 PROCEDURE — 36415 COLL VENOUS BLD VENIPUNCTURE: CPT

## 2020-12-23 PROCEDURE — 86922 COMPATIBILITY TEST ANTIGLOB: CPT

## 2020-12-23 PROCEDURE — C1894 INTRO/SHEATH, NON-LASER: HCPCS

## 2020-12-23 PROCEDURE — 80053 COMPREHEN METABOLIC PANEL: CPT

## 2020-12-23 PROCEDURE — 86901 BLOOD TYPING SEROLOGIC RH(D): CPT

## 2020-12-23 PROCEDURE — 86850 RBC ANTIBODY SCREEN: CPT

## 2020-12-23 RX ORDER — ROCURONIUM BROMIDE 10 MG/ML
INJECTION, SOLUTION INTRAVENOUS PRN
Status: DISCONTINUED | OUTPATIENT
Start: 2020-12-23 | End: 2020-12-23 | Stop reason: SDUPTHER

## 2020-12-23 RX ORDER — PROMETHAZINE HYDROCHLORIDE 25 MG/ML
6.25 INJECTION, SOLUTION INTRAMUSCULAR; INTRAVENOUS
Status: DISCONTINUED | OUTPATIENT
Start: 2020-12-23 | End: 2020-12-23 | Stop reason: HOSPADM

## 2020-12-23 RX ORDER — HYDROMORPHONE HYDROCHLORIDE 1 MG/ML
0.25 INJECTION, SOLUTION INTRAMUSCULAR; INTRAVENOUS; SUBCUTANEOUS EVERY 5 MIN PRN
Status: DISCONTINUED | OUTPATIENT
Start: 2020-12-23 | End: 2020-12-23 | Stop reason: HOSPADM

## 2020-12-23 RX ORDER — SUCCINYLCHOLINE CHLORIDE 20 MG/ML
INJECTION INTRAMUSCULAR; INTRAVENOUS PRN
Status: DISCONTINUED | OUTPATIENT
Start: 2020-12-23 | End: 2020-12-23 | Stop reason: SDUPTHER

## 2020-12-23 RX ORDER — PROPOFOL 10 MG/ML
INJECTION, EMULSION INTRAVENOUS PRN
Status: DISCONTINUED | OUTPATIENT
Start: 2020-12-23 | End: 2020-12-23 | Stop reason: SDUPTHER

## 2020-12-23 RX ORDER — LIDOCAINE HYDROCHLORIDE 10 MG/ML
INJECTION, SOLUTION EPIDURAL; INFILTRATION; INTRACAUDAL; PERINEURAL PRN
Status: DISCONTINUED | OUTPATIENT
Start: 2020-12-23 | End: 2020-12-23 | Stop reason: SDUPTHER

## 2020-12-23 RX ORDER — DIPHENHYDRAMINE HYDROCHLORIDE 50 MG/ML
12.5 INJECTION INTRAMUSCULAR; INTRAVENOUS
Status: DISCONTINUED | OUTPATIENT
Start: 2020-12-23 | End: 2020-12-23 | Stop reason: HOSPADM

## 2020-12-23 RX ORDER — MORPHINE SULFATE 4 MG/ML
4 INJECTION, SOLUTION INTRAMUSCULAR; INTRAVENOUS
Status: CANCELLED | OUTPATIENT
Start: 2020-12-23 | End: 2020-12-23

## 2020-12-23 RX ORDER — SODIUM CHLORIDE 9 MG/ML
INJECTION, SOLUTION INTRAVENOUS CONTINUOUS
Status: DISCONTINUED | OUTPATIENT
Start: 2020-12-23 | End: 2020-12-23 | Stop reason: HOSPADM

## 2020-12-23 RX ORDER — LIDOCAINE HYDROCHLORIDE 10 MG/ML
1 INJECTION, SOLUTION EPIDURAL; INFILTRATION; INTRACAUDAL; PERINEURAL
Status: CANCELLED | OUTPATIENT
Start: 2020-12-23 | End: 2020-12-23

## 2020-12-23 RX ORDER — SODIUM CHLORIDE 0.9 % (FLUSH) 0.9 %
10 SYRINGE (ML) INJECTION PRN
Status: DISCONTINUED | OUTPATIENT
Start: 2020-12-23 | End: 2020-12-23 | Stop reason: HOSPADM

## 2020-12-23 RX ORDER — MORPHINE SULFATE 4 MG/ML
4 INJECTION, SOLUTION INTRAMUSCULAR; INTRAVENOUS EVERY 5 MIN PRN
Status: DISCONTINUED | OUTPATIENT
Start: 2020-12-23 | End: 2020-12-23 | Stop reason: HOSPADM

## 2020-12-23 RX ORDER — LABETALOL HYDROCHLORIDE 5 MG/ML
5 INJECTION, SOLUTION INTRAVENOUS EVERY 10 MIN PRN
Status: DISCONTINUED | OUTPATIENT
Start: 2020-12-23 | End: 2020-12-23 | Stop reason: HOSPADM

## 2020-12-23 RX ORDER — HYDROMORPHONE HYDROCHLORIDE 1 MG/ML
0.5 INJECTION, SOLUTION INTRAMUSCULAR; INTRAVENOUS; SUBCUTANEOUS EVERY 5 MIN PRN
Status: DISCONTINUED | OUTPATIENT
Start: 2020-12-23 | End: 2020-12-23 | Stop reason: HOSPADM

## 2020-12-23 RX ORDER — MIDAZOLAM HYDROCHLORIDE 1 MG/ML
2 INJECTION INTRAMUSCULAR; INTRAVENOUS
Status: CANCELLED | OUTPATIENT
Start: 2020-12-23 | End: 2020-12-23

## 2020-12-23 RX ORDER — FENTANYL CITRATE 50 UG/ML
50 INJECTION, SOLUTION INTRAMUSCULAR; INTRAVENOUS
Status: CANCELLED | OUTPATIENT
Start: 2020-12-23 | End: 2020-12-23

## 2020-12-23 RX ORDER — SODIUM CHLORIDE, SODIUM LACTATE, POTASSIUM CHLORIDE, CALCIUM CHLORIDE 600; 310; 30; 20 MG/100ML; MG/100ML; MG/100ML; MG/100ML
INJECTION, SOLUTION INTRAVENOUS CONTINUOUS
Status: CANCELLED | OUTPATIENT
Start: 2020-12-23

## 2020-12-23 RX ORDER — SODIUM CHLORIDE 0.9 % (FLUSH) 0.9 %
10 SYRINGE (ML) INJECTION PRN
Status: CANCELLED | OUTPATIENT
Start: 2020-12-23

## 2020-12-23 RX ORDER — SCOLOPAMINE TRANSDERMAL SYSTEM 1 MG/1
1 PATCH, EXTENDED RELEASE TRANSDERMAL ONCE
Status: CANCELLED | OUTPATIENT
Start: 2020-12-23 | End: 2020-12-23

## 2020-12-23 RX ORDER — MORPHINE SULFATE 4 MG/ML
2 INJECTION, SOLUTION INTRAMUSCULAR; INTRAVENOUS EVERY 5 MIN PRN
Status: DISCONTINUED | OUTPATIENT
Start: 2020-12-23 | End: 2020-12-23 | Stop reason: HOSPADM

## 2020-12-23 RX ORDER — HYDRALAZINE HYDROCHLORIDE 20 MG/ML
5 INJECTION INTRAMUSCULAR; INTRAVENOUS EVERY 10 MIN PRN
Status: DISCONTINUED | OUTPATIENT
Start: 2020-12-23 | End: 2020-12-23 | Stop reason: HOSPADM

## 2020-12-23 RX ORDER — ONDANSETRON 2 MG/ML
INJECTION INTRAMUSCULAR; INTRAVENOUS PRN
Status: DISCONTINUED | OUTPATIENT
Start: 2020-12-23 | End: 2020-12-23 | Stop reason: SDUPTHER

## 2020-12-23 RX ORDER — SODIUM CHLORIDE 0.9 % (FLUSH) 0.9 %
10 SYRINGE (ML) INJECTION EVERY 12 HOURS SCHEDULED
Status: CANCELLED | OUTPATIENT
Start: 2020-12-23

## 2020-12-23 RX ORDER — MEPERIDINE HYDROCHLORIDE 50 MG/ML
12.5 INJECTION INTRAMUSCULAR; INTRAVENOUS; SUBCUTANEOUS EVERY 5 MIN PRN
Status: DISCONTINUED | OUTPATIENT
Start: 2020-12-23 | End: 2020-12-23 | Stop reason: HOSPADM

## 2020-12-23 RX ORDER — SODIUM CHLORIDE 0.9 % (FLUSH) 0.9 %
10 SYRINGE (ML) INJECTION EVERY 12 HOURS SCHEDULED
Status: DISCONTINUED | OUTPATIENT
Start: 2020-12-23 | End: 2020-12-23 | Stop reason: HOSPADM

## 2020-12-23 RX ORDER — METOCLOPRAMIDE HYDROCHLORIDE 5 MG/ML
10 INJECTION INTRAMUSCULAR; INTRAVENOUS
Status: DISCONTINUED | OUTPATIENT
Start: 2020-12-23 | End: 2020-12-23 | Stop reason: HOSPADM

## 2020-12-23 RX ORDER — DEXAMETHASONE SODIUM PHOSPHATE 10 MG/ML
INJECTION, SOLUTION INTRAMUSCULAR; INTRAVENOUS PRN
Status: DISCONTINUED | OUTPATIENT
Start: 2020-12-23 | End: 2020-12-23 | Stop reason: SDUPTHER

## 2020-12-23 RX ORDER — FENTANYL CITRATE 50 UG/ML
INJECTION, SOLUTION INTRAMUSCULAR; INTRAVENOUS PRN
Status: DISCONTINUED | OUTPATIENT
Start: 2020-12-23 | End: 2020-12-23 | Stop reason: SDUPTHER

## 2020-12-23 RX ADMIN — DEXAMETHASONE SODIUM PHOSPHATE 10 MG: 10 INJECTION, SOLUTION INTRAMUSCULAR; INTRAVENOUS at 14:07

## 2020-12-23 RX ADMIN — PROPOFOL 160 MG: 10 INJECTION, EMULSION INTRAVENOUS at 14:01

## 2020-12-23 RX ADMIN — Medication 2 G: at 14:08

## 2020-12-23 RX ADMIN — DEXAMETHASONE SODIUM PHOSPHATE 10 MG: 10 INJECTION, SOLUTION INTRAMUSCULAR; INTRAVENOUS at 14:06

## 2020-12-23 RX ADMIN — FENTANYL CITRATE 100 MCG: 50 INJECTION, SOLUTION INTRAMUSCULAR; INTRAVENOUS at 13:51

## 2020-12-23 RX ADMIN — ROCURONIUM BROMIDE 50 MG: 10 INJECTION, SOLUTION INTRAVENOUS at 14:14

## 2020-12-23 RX ADMIN — LIDOCAINE HYDROCHLORIDE 50 MG: 10 INJECTION, SOLUTION EPIDURAL; INFILTRATION; INTRACAUDAL; PERINEURAL at 14:01

## 2020-12-23 RX ADMIN — ONDANSETRON HYDROCHLORIDE 4 MG: 2 SOLUTION INTRAMUSCULAR; INTRAVENOUS at 14:28

## 2020-12-23 RX ADMIN — SUCCINYLCHOLINE CHLORIDE 100 MG: 20 INJECTION, SOLUTION INTRAMUSCULAR; INTRAVENOUS at 14:01

## 2020-12-23 RX ADMIN — SUGAMMADEX 400 MG: 100 INJECTION, SOLUTION INTRAVENOUS at 14:28

## 2020-12-23 RX ADMIN — SODIUM CHLORIDE: 9 INJECTION, SOLUTION INTRAVENOUS at 10:39

## 2020-12-23 ASSESSMENT — LIFESTYLE VARIABLES: SMOKING_STATUS: 0

## 2020-12-23 ASSESSMENT — ENCOUNTER SYMPTOMS: SHORTNESS OF BREATH: 0

## 2020-12-23 NOTE — PROGRESS NOTES
Patient ambulated. Talked with Dr. Bell Louder. Discharge instructions given. Patient and wife voiced understanding.

## 2020-12-23 NOTE — H&P
[facility])- Cardiology      OFFICE VISIT:  2020    Leti Sotelo - : 1945    Reason For Visit:  Krissy Mcintyre is a 76 y.o. male who is here for discussion regarding watchman procedure    HPI     Tonio [facility] is a 76 y.o. male with history of hypertension, hyperlipidemia, thyroidectomy, former nicotine dependence, a family history of CAD, and proximal atrial fibrillation who presents with the chief complaint of six-month follow-up. Krissy Mcintyre has history of multiple episodes of bleeding on Coumadin, him and his wife mentioned that he has bleeding from his nose multiple times especially in the morning after he woke up from sleep, they changing bedsheets almost every day despite therapeutic INR  Also has bleeding in both his legs from the skin with the slightest scratch or trauma    He is here to discuss the option for left it appendage closure as alternative to blood thinners    He has no exertional chest pain, pressure, burning, tightness or squeezing. No symptomatic tachy- or quique-arrhythmia. No lightheadedness, dizziness, or syncope. No numbness or weakness to suggest cerebrovascular accident or transient ischemic attack. he reports no edema or shortness of breath. He denies orthopnea or paroxysmal nocturnal dyspnea. he is sleeping on 1 pillow at night. he has been compliant with his medications. his BP has been controlled at home. he reports no activity change. PCP follows lipids and labs. Naga Chen MD is PCP.   Leti Sotelo has the following history as recorded in [facility]:    Patient Active Problem List    Diagnosis Date Noted    Longstanding persistent atrial fibrillation ([address]     Essential hypertension 2020    Abnormal EKG 2016    Sinoatrial node dysfunction ([address] 2016    Atrial flutter ([address]     Major depressive disorder, single episode, moderate ([address] 2016    Hypercholesteremia 2012     Past Medical History:   Diagnosis Date    Bradycardia     CAD (coronary artery disease)     cath 2015 diffuse disease through out the coronary tree, 30% RCA    Diverticulitis     Hernia, umbilical 2334    History of bleeding ulcers     Hyperlipidemia     Hypertension     Hypogonadism male     Hypothyroidism     PAF (paroxysmal atrial fibrillation) ([facility] 5/4/15, 5/19/15    s/p cardioversion    Sinoatrial node dysfunction ([facility] 2016    3/12/2015  DCCV successful on lopressor 12.5 bid 2015  DCCV successful on rhythmol 150 tid 2015  DCCV successful on rhythmol 225 tid 2016  Recurrent atrial flutter 16  DCCV successful on rythmol 300 tid        Past Surgical History:   Procedure Laterality Date    CARDIAC CATHETERIZATION  2/19/15  JDT    EF 50%    CARDIOVERSION  5/4/15, 5/19/15    CHOLECYSTECTOMY  2010    CYST REMOVAL  1948    DENTAL SURGERY  [address]   [address]    KNEE ARTHROPLASTY  2001    KNEE SURGERY  1963    MOUTH SURGERY  2020    THYROIDECTOMY  10/29/2018     Family History   Problem Relation Age of Onset    Heart Attack Father      Social History     Tobacco Use    Smoking status: Former Smoker     Types: Cigarettes     Quit date: 1997     Years since quittin.3    Smokeless tobacco: Never Used   Substance Use Topics    Alcohol use:  Yes     Alcohol/week: 0.0 standard drinks     Comment: minimal      Current Outpatient Medications   Medication Sig Dispense Refill    levothyroxine (SYNTHROID) 175 MCG tablet Take 175 mcg by mouth Daily      lisinopril (PRINIVIL;ZESTRIL) 10 MG tablet Take 10 mg by mouth daily      warfarin (COUMADIN) 5 MG tablet Take 1 tablet by mouth daily 30 tablet 3    BIOTIN PO Take 1 tablet by mouth daily      propafenone (RYTHMOL) 300 MG tablet TAKE ONE TABLET BY MOUTH EVERY 8 HOURS. 270 tablet 3    alclomethasone (ACLOVATE) 0.05 % cream Apply topically as needed       sodium chloride (OCEAN NASAL SPRAY) 0.65 % nasal spray 2 sprays by Nasal route as needed for Congestion 1 Bottle 3    oxymetazoline (AFRIN NASAL SPRAY) 0.05 % nasal spray Spray 2 sprays to affected nostril as needed for nosebleed 1 Bottle 3    citalopram (CELEXA) 40 MG tablet Take 40 mg by mouth daily  1    aspirin 81 MG tablet Take 81 mg by mouth daily      risperiDONE (RISPERDAL) 0.5 MG tablet Take 0.5 mg by mouth every evening      fenofibrate 160 MG tablet Take 160 mg by mouth daily      Multiple Vitamins-Minerals (THERAPEUTIC MULTIVITAMIN-MINERALS) tablet Take 1 tablet by mouth daily      terazosin (HYTRIN) 1 MG capsule Take 1 mg by mouth nightly       TESTOSTERONE IM Inject 400 mg into the muscle once a week Every friday       No current facility-administered medications for this encounter. Allergies: Doxycycline, Levofloxacin, Septra [sulfamethoxazole-trimethoprim], and Sulfa antibiotics    Review of Systems  Review of Systems   Constitutional: Negative for activity change, diaphoresis, fatigue, fever and unexpected weight change. HENT: Positive for nosebleeds. Negative for facial swelling and trouble swallowing. Eyes: Negative for discharge, redness and visual disturbance. Respiratory: Negative for cough, shortness of breath and wheezing. Cardiovascular: Negative for chest pain, palpitations and leg swelling. Gastrointestinal: Negative for abdominal distention, abdominal pain, blood in stool, nausea and vomiting. Endocrine: Negative for cold intolerance and heat intolerance. Genitourinary: Negative for dysuria and hematuria. Musculoskeletal: Negative for gait problem. Skin: Negative for color change, pallor and rash. Neurological: Negative for dizziness, syncope, facial asymmetry and light-headedness. Hematological: Does not bruise/bleed easily. Psychiatric/Behavioral: Negative for behavioral problems and confusion. All other systems reviewed and are negative.       Objective  Vital Signs - There were no vitals taken for this visit.  Physical Exam  Vitals signs and nursing note reviewed. Constitutional:       Appearance: He is well-developed. HENT:      Head: Normocephalic and atraumatic. Right Ear: External ear normal.      Left Ear: External ear normal.      Nose: Nose normal.   Eyes:      General:         Right eye: No discharge. Left eye: No discharge. Pupils: Pupils are equal, round, and reactive to light. Neck:      Musculoskeletal: Normal range of motion. No edema. Vascular: No carotid bruit or JVD. Trachea: No tracheal deviation. Cardiovascular:      Rate and Rhythm: Regular rhythm. Bradycardia present. Heart sounds: Normal heart sounds. No murmur. No friction rub. No gallop. Pulmonary:      Effort: Pulmonary effort is normal. No respiratory distress. Breath sounds: No wheezing, rhonchi or rales. Abdominal:      General: Bowel sounds are normal. There is no distension. Palpations: Abdomen is soft. Tenderness: There is no abdominal tenderness. Musculoskeletal: Normal range of motion. Skin:     General: Skin is warm and dry. Capillary Refill: Capillary refill takes less than 2 seconds. Findings: No rash. Neurological:      Mental Status: He is alert and oriented to person, place, and time.    Psychiatric:         Behavior: Behavior normal.         Judgment: Judgment normal.         Cardiac data:    ECG 12/22/20  Sinus bradycardia with left bundle branch block, nonspecific ST T wave abnormalities (unchanged from previous EKG)    QTc 0.442 ms    2/18/2015 TTE estimated EF 50%, physiologic pericardial effusion without obvious intracardiac mass  2/19/2015 cath luminary irregularities involving the entire coronary tree, 30% lesions in the RCA, small mid distal LAD without focal obstruction  3/12/2015 DCCV successful on Lopressor 12-1/2 twice daily  5/4/2015 DCCV successful on Rythmol 150, 3 times daily  5/19/2015 DCCV successful on Rythmol 225, 3 times understand that if post-procedure the patient develops another reason for anti-coagulation that this is a separate issue from the stroke prophylaxis for atrial fibrillation and that the device does not negate need for anticoagulation for other reasons. At this time, the only reason for anticoagulation is atrial fibrillation. All of the patient's/family's questions were answered to their satisfaction and they have decided to move forward with further work-up for the Watchman device. We will follow-up with the patient and primary cardiologist/caregivers throughout the process. Plan:  1) Pre-procedure imaging NELSON   2) Structural Heart Coordinator Hayder Bar RN to follow-up to ensure work-up completed  3) Watchman information/brochure given  4) Continue all anticoagulation as Rx'ed till instructed otherwise by the Structural Heart Team      Please do not hesitate to contact me for any questions or concerns. Sincerely yours,      Cara Banuelos MD, [facility]  Interventional Cardiologist, Endovascular Specialist   Medical Director, [facility]    This dictation was generated by voice recognition computer software. Although all attempts are made to edit dictation for accuracy, there may be errors in the transcription that are not intended. Risks, benefits, alternatives of watchman left atrial appendage closure discussed with the patient   I explained the procedure to the patient in detail and fully informed consent obtained.   Acceptable Mallampati score, ASA 3  Consent for general anesthesia be obtained by general Anesthesia MD Cara Banuelos MD, [facility]  Interventional Cardiologist, Endovascular Specialist   Medical Director, [facility]

## 2020-12-23 NOTE — ADDENDUM NOTE
Addendum  created 12/23/20 1453 by Ade Murphy MD    Child order released for a procedure order, Clinical Note Signed, Intraprocedure Blocks edited

## 2020-12-23 NOTE — PROGRESS NOTES
Patient ambulated in room without difficulty. Partially dressed and waiting with family to discuss findings of NELSON and procedure  with Dr. Reyna Chopra.

## 2020-12-23 NOTE — ANESTHESIA PROCEDURE NOTES
Procedure Performed: NELSON       Start Time:  12/23/2020 1:00 PM       End Time:   12/23/2020 2:55 PM    Preanesthesia Checklist:  Patient identified, IV assessed, risks and benefits discussed, monitors and equipment assessed, procedure being performed at surgeon's request and anesthesia consent obtained. General Procedure Information  Diagnostic Indications for Echo:  assessment of ascending aorta, hemodynamic monitoring and assessment of valve function  Physician Requesting Echo: Consuelo Tom MD  Location performed:  OR  Intubated  Bite block placed  Heart visualized  Probe Insertion:  Easy  Probe Type:  3D  Modalities:  2D only, 3D, color flow mapping, continuous wave Doppler and pulse wave Doppler    Echocardiographic and Doppler Measurements    Ventricles    Right Ventricle:  Cavity size normal.  Hypertrophy not present. Thrombus not present. Global function normal.    Left Ventricle:  Cavity size normal.  Hypertrophy not present. Thrombus not present. Global Function normal.      Ventricular Regional Function:  1- Basal Anteroseptal:  normal  2- Basal Anterior:  normal  3- Basal Anterolateral:  normal  4- Basal Inferolateral:  normal  5- Basal Inferior:  normal  6- Basal Inferoseptal:  normal  7- Mid Anteroseptal:  normal  8- Mid Anterior:  normal  9- Mid Anterolateral:  normal  10- Mid Inferolateral:  normal  11- Mid Inferior:  normal  12- Mid Inferoseptal:  normal  13- Apical Anterior:  normal  14- Apical Lateral:  normal  15- Apical Inferior:  normal  16- Apical Septal:  normal  17- [place]:  normal      Valves    Aortic Valve: Annulus normal.  Stenosis not present. Regurgitation none. Leaflets normal.  Leaflet motions normal.      Mitral Valve: Annulus normal.  Stenosis not present. Regurgitation moderate. Leaflets normal.  Leaflet motions normal.      Tricuspid Valve: Annulus normal.  Stenosis not present. Regurgitation none.   Leaflets normal.  Leaflet motions normal.    Pulmonic Valve: Annulus normal.  Stenosis not present. Regurgitation none. Aorta    Ascending Aorta:  Size dilated. Dissection not present. Aortic Arch:  Size normal.  Dissection not present. Descending Aorta:  Size normal.  Dissection not present. Atria    Right Atrium:  Size normal.  Spontaneous echo contrast not present. Thrombus not present. Tumor not present. Device not present. Left Atrium:  Size normal.  Spontaneous echo contrast present. Thrombus not present. Tumor not present. Device not present.     Left atrial appendage normal.  Other Atria Findings:       Small CHERRY diameter in widest section 14mm    Septa    Atrial Septum:  Intra-atrial septal morphology normal.      Ventricular Septum:  Intra-ventricular septum morphology normal.          Other Findings  Pericardium:  normal  Pleural Effusion:  none  Pulmonary Arteries:  normal  Pulmonary Venous Flow:  normal    Anesthesia Information  Performed Personally  Anesthesiologist:  Kelly Coelho MD      Echocardiogram Comments:       Due to small size CHERRY watchman aborted per Cardiologist

## 2020-12-23 NOTE — ANESTHESIA PRE PROCEDURE
Department of Anesthesiology  Preprocedure Note       Name:  Charline Flores   Age:  76 y.o.  :  1945                                          MRN:  [id number]         Date:  2020      Surgeon: * No surgeons listed *    Procedure: * No procedures listed *    Medications prior to admission:   Prior to Admission medications    Medication Sig Start Date End Date Taking?  Authorizing Provider   levothyroxine (SYNTHROID) 175 MCG tablet Take 175 mcg by mouth Daily   Yes Historical Provider, MD   lisinopril (PRINIVIL;ZESTRIL) 10 MG tablet Take 10 mg by mouth daily   Yes Historical Provider, MD   warfarin (COUMADIN) 5 MG tablet Take 1 tablet by mouth daily 20  Yes DYLON Wilhelm   BIOTIN PO Take 1 tablet by mouth daily   Yes Historical Provider, MD   propafenone (RYTHMOL) 300 MG tablet TAKE ONE TABLET BY MOUTH EVERY 8 HOURS. 20  Yes Latanya Pickens MD   alclomethasone (ACLOVATE) 0.05 % cream Apply topically as needed  19  Yes Historical Provider, MD   oxymetazoline (AFRIN NASAL SPRAY) 0.05 % nasal spray Spray 2 sprays to affected nostril as needed for nosebleed 19  Yes Rhonda Sanchez, APRN - CNP   citalopram (CELEXA) 40 MG tablet Take 40 mg by mouth daily 3/5/19  Yes Historical Provider, MD   aspirin 81 MG tablet Take 81 mg by mouth daily   Yes Historical Provider, MD   risperiDONE (RISPERDAL) 0.5 MG tablet Take 0.5 mg by mouth every evening   Yes Historical Provider, MD   fenofibrate 160 MG tablet Take 160 mg by mouth daily   Yes Historical Provider, MD   Multiple Vitamins-Minerals (THERAPEUTIC MULTIVITAMIN-MINERALS) tablet Take 1 tablet by mouth daily   Yes Historical Provider, MD   terazosin (HYTRIN) 1 MG capsule Take 1 mg by mouth nightly  12/21/15  Yes Historical Provider, MD   TESTOSTERONE IM Inject 400 mg into the muscle once a week Every friday   Yes Historical Provider, MD   sodium chloride (OCEAN NASAL SPRAY) 0.65 % nasal spray 2 sprays by Nasal route as needed for Congestion 19 Rhonda Steinberg, APRN - CNP       Current medications:    Current Facility-Administered Medications   Medication Dose Route Frequency Provider Last Rate Last Admin    0.9 % sodium chloride infusion   Intravenous Continuous Jacy Neri MD 75 mL/hr at 12/23/20 1039 New Bag at 12/23/20 1039    sodium chloride flush 0.9 % injection 10 mL  10 mL Intravenous 2 times per day Jacy Neri MD        sodium chloride flush 0.9 % injection 10 mL  10 mL Intravenous PRN Jacy Neri MD           Allergies:     Allergies   Allergen Reactions    Doxycycline     Levofloxacin     Septra [Sulfamethoxazole-Trimethoprim]     Sulfa Antibiotics Itching       Problem List:    Patient Active Problem List   Diagnosis Code    Hypercholesteremia E78.00    Major depressive disorder, single episode, moderate (HCC) F32.1    Abnormal EKG R94.31    Sinoatrial node dysfunction (HCC) I49.5    Atrial flutter (HCC) I48.92    Essential hypertension I10    Longstanding persistent atrial fibrillation (HCC) I48.11       Past Medical History:        Diagnosis Date    Bradycardia     CAD (coronary artery disease)     cath 2015 diffuse disease through out the coronary tree, 30% RCA    Cancer ([facility]     Thyroid cancer    Diverticulitis 2008    Hernia, umbilical 7303    History of bleeding ulcers 1972/1973    Hyperlipidemia     Hypertension     Hypogonadism male 2009    Hypothyroidism     PAF (paroxysmal atrial fibrillation) ([facility] 5/4/15, 5/19/15    s/p cardioversion    Sinoatrial node dysfunction ([facility] 6/17/2016    3/12/2015  DCCV successful on lopressor 12.5 bid 05/04/2015  DCCV successful on rhythmol 150 tid 05/19/2015  DCCV successful on rhythmol 225 tid 6/16/2016  Recurrent atrial flutter 6/17/16  DCCV successful on rythmol 300 tid          Past Surgical History:        Procedure Laterality Date    CARDIAC CATHETERIZATION  2/19/15  JDT    EF 50%    CARDIOVERSION  5/4/15, 5/19/15    CHOLECYSTECTOMY  01/26/2010    CYST REMOVAL  1948   [facility] DENTAL SURGERY  2004    HERNIA REPAIR      KNEE ARTHROPLASTY  2001    KNEE SURGERY  1963    MOUTH SURGERY  2020    THYROIDECTOMY  10/29/2018       Social History:    Social History     Tobacco Use    Smoking status: Former Smoker     Types: Cigarettes     Quit date: 1997     Years since quittin.3    Smokeless tobacco: Never Used   Substance Use Topics    Alcohol use: Yes     Alcohol/week: 0.0 standard drinks     Comment: minimal                                Counseling given: Not Answered      Vital Signs (Current):   Vitals:    20 1029   BP: (!) 162/97   Pulse: 52   Resp: 16   Temp: 97.7 °F (36.5 °C)   TempSrc: Tympanic   SpO2: 98%   Weight: 220 lb (99.8 kg)   Height: 6' 1\" (1.854 m)                                              BP Readings from Last 3 Encounters:   20 (!) 162/97   20 115/65   20 134/86       NPO Status: Time of last liquid consumption: 0800                        Time of last solid consumption:                         Date of last liquid consumption: 20                        Date of last solid food consumption: 20    BMI:   Wt Readings from Last 3 Encounters:   20 220 lb (99.8 kg)   20 216 lb (98 kg)   20 217 lb (98.4 kg)     Body mass index is 29.03 kg/m².     CBC:   Lab Results   Component Value Date    WBC 6.8 2020    RBC 5.53 2020    HGB 17.1 2020    HCT 53.3 2020    MCV 96.4 2020    RDW 13.9 2020     2020       CMP:   Lab Results   Component Value Date     2020    K 4.3 2020     2020    CO2 24 2020    BUN 24 2020    CREATININE 1.9 2020    GFRAA 42 2020    LABGLOM 35 2020    GLUCOSE 91 2020    PROT 7.2 2020    CALCIUM 9.2 2020    BILITOT 0.8 2020    ALKPHOS 36 2020    AST 19 2020    ALT 15 2020       POC Tests: No results for with patient. Use of blood products discussed with patient whom. Plan discussed with CRNA.     Attending anesthesiologist reviewed and agrees with Pre Eval content              Marquita Pearce MD   12/23/2020

## 2020-12-27 LAB
BLOOD BANK DISPENSE STATUS: NORMAL
BLOOD BANK DISPENSE STATUS: NORMAL
BLOOD BANK PRODUCT CODE: NORMAL
BLOOD BANK PRODUCT CODE: NORMAL
BPU ID: NORMAL
BPU ID: NORMAL
DESCRIPTION BLOOD BANK: NORMAL
DESCRIPTION BLOOD BANK: NORMAL

## 2020-12-29 LAB — K ANTIGEN: NORMAL

## 2020-12-30 LAB — INR BLD: 1.7

## 2021-01-05 ENCOUNTER — ANTI-COAG VISIT (OUTPATIENT)
Dept: CARDIOLOGY CLINIC | Age: 76
End: 2021-01-05

## 2021-01-27 DIAGNOSIS — I48.92 ATRIAL FLUTTER, UNSPECIFIED TYPE (HCC): ICD-10-CM

## 2021-01-27 RX ORDER — PROPAFENONE HYDROCHLORIDE 300 MG/1
TABLET, COATED ORAL
Qty: 270 TABLET | Refills: 3 | Status: SHIPPED | OUTPATIENT
Start: 2021-01-27 | End: 2021-11-12

## 2021-01-29 ENCOUNTER — IMMUNIZATION (OUTPATIENT)
Age: 76
End: 2021-01-29
Payer: MEDICARE

## 2021-01-29 PROCEDURE — 91300 COVID-19, PFIZER VACCINE 30MCG/0.3ML DOSE: CPT | Performed by: FAMILY MEDICINE

## 2021-01-29 PROCEDURE — 0001A COVID-19, PFIZER VACCINE 30MCG/0.3ML DOSE: CPT | Performed by: FAMILY MEDICINE

## 2021-02-09 ENCOUNTER — TELEPHONE (OUTPATIENT)
Dept: CARDIOLOGY CLINIC | Age: 76
End: 2021-02-09

## 2021-02-09 NOTE — TELEPHONE ENCOUNTER
Patients wife called wanting know if there a date yet for patient's watchman procedure. She said they had come in originally on 12/23/20 but his appendage was too small for device.

## 2021-02-16 RX ORDER — WARFARIN SODIUM 5 MG/1
TABLET ORAL
Qty: 30 TABLET | Refills: 3 | Status: SHIPPED | OUTPATIENT
Start: 2021-02-16 | End: 2021-07-02

## 2021-05-24 LAB — INR BLD: 1.7

## 2021-05-25 ENCOUNTER — ANTI-COAG VISIT (OUTPATIENT)
Dept: CARDIOLOGY CLINIC | Age: 76
End: 2021-05-25

## 2021-07-01 ENCOUNTER — ANTI-COAG VISIT (OUTPATIENT)
Dept: CARDIOLOGY CLINIC | Age: 76
End: 2021-07-01

## 2021-07-01 LAB — INR BLD: 1.9

## 2021-07-02 ENCOUNTER — TELEPHONE (OUTPATIENT)
Dept: CARDIOLOGY CLINIC | Age: 76
End: 2021-07-02

## 2021-07-02 RX ORDER — WARFARIN SODIUM 5 MG/1
TABLET ORAL
Qty: 30 TABLET | Refills: 3 | Status: ON HOLD | OUTPATIENT
Start: 2021-07-02 | End: 2021-08-30 | Stop reason: HOSPADM

## 2021-07-15 ENCOUNTER — TELEPHONE (OUTPATIENT)
Dept: CARDIOLOGY | Age: 76
End: 2021-07-15

## 2021-07-15 NOTE — TELEPHONE ENCOUNTER
Called and spoke with patient, sindhu salinas scheduled for 7/29/2021 at 56 with arrival of 0830. Patient is to be NPO after midnight. Patient instructed to arrive through front entrance of hospital and make immediate left. Patient advised they can have one person with them but they both must wear a mask. Patient will hold anticoagulation 2 days prior to procedure and bring vaccination card with him morning of procedure. Patient does not have IV dye allergy. Patient verbally understood.

## 2021-07-29 ENCOUNTER — ANESTHESIA EVENT (OUTPATIENT)
Dept: CARDIAC CATH/INVASIVE PROCEDURES | Age: 76
DRG: 274 | End: 2021-07-29
Payer: MEDICARE

## 2021-07-29 ENCOUNTER — ANESTHESIA (OUTPATIENT)
Dept: CARDIAC CATH/INVASIVE PROCEDURES | Age: 76
DRG: 274 | End: 2021-07-29
Payer: MEDICARE

## 2021-07-29 ENCOUNTER — HOSPITAL ENCOUNTER (INPATIENT)
Dept: CARDIAC CATH/INVASIVE PROCEDURES | Age: 76
LOS: 1 days | Discharge: HOME | DRG: 274 | End: 2021-07-30
Attending: INTERNAL MEDICINE | Admitting: INTERNAL MEDICINE
Payer: MEDICARE

## 2021-07-29 VITALS — SYSTOLIC BLOOD PRESSURE: 172 MMHG | DIASTOLIC BLOOD PRESSURE: 81 MMHG | OXYGEN SATURATION: 95 % | TEMPERATURE: 97.2 F

## 2021-07-29 PROBLEM — I48.91 ATRIAL FIBRILLATION (HCC): Status: ACTIVE | Noted: 2021-07-29

## 2021-07-29 LAB
ABO/RH: NORMAL
ALBUMIN SERPL-MCNC: 4.5 G/DL (ref 3.5–5.2)
ALP BLD-CCNC: 41 U/L (ref 40–130)
ALT SERPL-CCNC: 17 U/L (ref 5–41)
ANION GAP SERPL CALCULATED.3IONS-SCNC: 8 MMOL/L (ref 7–19)
ANTIBODY IDENTIFICATION: NORMAL
ANTIBODY SCREEN: NORMAL
AST SERPL-CCNC: 17 U/L (ref 5–40)
BASOPHILS ABSOLUTE: 0 K/UL (ref 0–0.2)
BASOPHILS RELATIVE PERCENT: 0.3 % (ref 0–1)
BILIRUB SERPL-MCNC: 0.6 MG/DL (ref 0.2–1.2)
BUN BLDV-MCNC: 25 MG/DL (ref 8–23)
CALCIUM SERPL-MCNC: 9.2 MG/DL (ref 8.8–10.2)
CHLORIDE BLD-SCNC: 105 MMOL/L (ref 98–111)
CO2: 26 MMOL/L (ref 22–29)
CREAT SERPL-MCNC: 1.8 MG/DL (ref 0.5–1.2)
EOSINOPHILS ABSOLUTE: 0 K/UL (ref 0–0.6)
EOSINOPHILS RELATIVE PERCENT: 0.2 % (ref 0–5)
GFR AFRICAN AMERICAN: 45
GFR NON-AFRICAN AMERICAN: 37
GLUCOSE BLD-MCNC: 104 MG/DL (ref 74–109)
HCT VFR BLD CALC: 45 % (ref 42–52)
HCT VFR BLD CALC: 45.5 % (ref 42–52)
HEMOGLOBIN: 15.1 G/DL (ref 14–18)
HEMOGLOBIN: 15.1 G/DL (ref 14–18)
IMMATURE GRANULOCYTES #: 0.1 K/UL
INR BLD: 1.61 (ref 0.88–1.18)
LV EF: 60 %
LVEF MODALITY: NORMAL
LYMPHOCYTES ABSOLUTE: 0.5 K/UL (ref 1.1–4.5)
LYMPHOCYTES RELATIVE PERCENT: 7.1 % (ref 20–40)
MCH RBC QN AUTO: 32.1 PG (ref 27–31)
MCH RBC QN AUTO: 32.5 PG (ref 27–31)
MCHC RBC AUTO-ENTMCNC: 33.2 G/DL (ref 33–37)
MCHC RBC AUTO-ENTMCNC: 33.6 G/DL (ref 33–37)
MCV RBC AUTO: 96.6 FL (ref 80–94)
MCV RBC AUTO: 96.8 FL (ref 80–94)
MONOCYTES ABSOLUTE: 0.1 K/UL (ref 0–0.9)
MONOCYTES RELATIVE PERCENT: 1.3 % (ref 0–10)
NEUTROPHILS ABSOLUTE: 5.7 K/UL (ref 1.5–7.5)
NEUTROPHILS RELATIVE PERCENT: 89.8 % (ref 50–65)
PDW BLD-RTO: 15 % (ref 11.5–14.5)
PDW BLD-RTO: 15.2 % (ref 11.5–14.5)
PLATELET # BLD: 183 K/UL (ref 130–400)
PLATELET # BLD: 194 K/UL (ref 130–400)
PMV BLD AUTO: 9.5 FL (ref 9.4–12.4)
PMV BLD AUTO: 9.8 FL (ref 9.4–12.4)
POTASSIUM SERPL-SCNC: 4.1 MMOL/L (ref 3.5–5)
PROTHROMBIN TIME: 19.1 SEC (ref 12–14.6)
RBC # BLD: 4.65 M/UL (ref 4.7–6.1)
RBC # BLD: 4.71 M/UL (ref 4.7–6.1)
SODIUM BLD-SCNC: 139 MMOL/L (ref 136–145)
TOTAL PROTEIN: 7.2 G/DL (ref 6.6–8.7)
WBC # BLD: 6 K/UL (ref 4.8–10.8)
WBC # BLD: 6.3 K/UL (ref 4.8–10.8)

## 2021-07-29 PROCEDURE — C1894 INTRO/SHEATH, NON-LASER: HCPCS

## 2021-07-29 PROCEDURE — 3700000001 HC ADD 15 MINUTES (ANESTHESIA)

## 2021-07-29 PROCEDURE — 6360000002 HC RX W HCPCS: Performed by: ANESTHESIOLOGY

## 2021-07-29 PROCEDURE — 93325 DOPPLER ECHO COLOR FLOW MAPG: CPT

## 2021-07-29 PROCEDURE — 7100000000 HC PACU RECOVERY - FIRST 15 MIN

## 2021-07-29 PROCEDURE — 2709999900 HC NON-CHARGEABLE SUPPLY

## 2021-07-29 PROCEDURE — 3700000000 HC ANESTHESIA ATTENDED CARE

## 2021-07-29 PROCEDURE — 2500000003 HC RX 250 WO HCPCS: Performed by: REGISTERED NURSE

## 2021-07-29 PROCEDURE — 93321 DOPPLER ECHO F-UP/LMTD STD: CPT

## 2021-07-29 PROCEDURE — 86922 COMPATIBILITY TEST ANTIGLOB: CPT

## 2021-07-29 PROCEDURE — C1893 INTRO/SHEATH, FIXED,NON-PEEL: HCPCS

## 2021-07-29 PROCEDURE — 36415 COLL VENOUS BLD VENIPUNCTURE: CPT

## 2021-07-29 PROCEDURE — 2580000003 HC RX 258: Performed by: ANESTHESIOLOGY

## 2021-07-29 PROCEDURE — 02L70DK OCCLUSION OF LEFT ATRIAL APPENDAGE WITH INTRALUMINAL DEVICE, OPEN APPROACH: ICD-10-PCS | Performed by: INTERNAL MEDICINE

## 2021-07-29 PROCEDURE — 86900 BLOOD TYPING SEROLOGIC ABO: CPT

## 2021-07-29 PROCEDURE — 2500000003 HC RX 250 WO HCPCS: Performed by: ANESTHESIOLOGY

## 2021-07-29 PROCEDURE — 86901 BLOOD TYPING SEROLOGIC RH(D): CPT

## 2021-07-29 PROCEDURE — 6370000000 HC RX 637 (ALT 250 FOR IP): Performed by: INTERNAL MEDICINE

## 2021-07-29 PROCEDURE — 80053 COMPREHEN METABOLIC PANEL: CPT

## 2021-07-29 PROCEDURE — 2580000003 HC RX 258: Performed by: NURSE PRACTITIONER

## 2021-07-29 PROCEDURE — 2580000003 HC RX 258: Performed by: REGISTERED NURSE

## 2021-07-29 PROCEDURE — 85027 COMPLETE CBC AUTOMATED: CPT

## 2021-07-29 PROCEDURE — 86850 RBC ANTIBODY SCREEN: CPT

## 2021-07-29 PROCEDURE — 7100000001 HC PACU RECOVERY - ADDTL 15 MIN

## 2021-07-29 PROCEDURE — 85025 COMPLETE CBC W/AUTO DIFF WBC: CPT

## 2021-07-29 PROCEDURE — 6360000004 HC RX CONTRAST MEDICATION: Performed by: REGISTERED NURSE

## 2021-07-29 PROCEDURE — C1889 IMPLANT/INSERT DEVICE, NOC: HCPCS

## 2021-07-29 PROCEDURE — 86870 RBC ANTIBODY IDENTIFICATION: CPT

## 2021-07-29 PROCEDURE — 33340 PERQ CLSR TCAT L ATR APNDGE: CPT

## 2021-07-29 PROCEDURE — 86905 BLOOD TYPING RBC ANTIGENS: CPT

## 2021-07-29 PROCEDURE — 6360000002 HC RX W HCPCS: Performed by: REGISTERED NURSE

## 2021-07-29 PROCEDURE — 6360000002 HC RX W HCPCS

## 2021-07-29 PROCEDURE — 85610 PROTHROMBIN TIME: CPT

## 2021-07-29 PROCEDURE — 93005 ELECTROCARDIOGRAM TRACING: CPT | Performed by: INTERNAL MEDICINE

## 2021-07-29 PROCEDURE — 2140000000 HC CCU INTERMEDIATE R&B

## 2021-07-29 PROCEDURE — 2580000003 HC RX 258: Performed by: INTERNAL MEDICINE

## 2021-07-29 PROCEDURE — 33340 PERQ CLSR TCAT L ATR APNDGE: CPT | Performed by: INTERNAL MEDICINE

## 2021-07-29 RX ORDER — SODIUM CHLORIDE, SODIUM LACTATE, POTASSIUM CHLORIDE, CALCIUM CHLORIDE 600; 310; 30; 20 MG/100ML; MG/100ML; MG/100ML; MG/100ML
INJECTION, SOLUTION INTRAVENOUS CONTINUOUS
Status: DISCONTINUED | OUTPATIENT
Start: 2021-07-29 | End: 2021-07-30 | Stop reason: HOSPADM

## 2021-07-29 RX ORDER — PROTAMINE SULFATE 10 MG/ML
INJECTION, SOLUTION INTRAVENOUS PRN
Status: DISCONTINUED | OUTPATIENT
Start: 2021-07-29 | End: 2021-07-29 | Stop reason: SDUPTHER

## 2021-07-29 RX ORDER — M-VIT,TX,IRON,MINS/CALC/FOLIC 27MG-0.4MG
1 TABLET ORAL DAILY
Status: DISCONTINUED | OUTPATIENT
Start: 2021-07-29 | End: 2021-07-30 | Stop reason: HOSPADM

## 2021-07-29 RX ORDER — EPHEDRINE SULFATE 50 MG/ML
INJECTION, SOLUTION INTRAVENOUS PRN
Status: DISCONTINUED | OUTPATIENT
Start: 2021-07-29 | End: 2021-07-29 | Stop reason: SDUPTHER

## 2021-07-29 RX ORDER — DOXAZOSIN MESYLATE 1 MG/1
1 TABLET ORAL DAILY
Status: DISCONTINUED | OUTPATIENT
Start: 2021-07-29 | End: 2021-07-30 | Stop reason: HOSPADM

## 2021-07-29 RX ORDER — SODIUM CHLORIDE 9 MG/ML
INJECTION, SOLUTION INTRAVENOUS CONTINUOUS PRN
Status: DISCONTINUED | OUTPATIENT
Start: 2021-07-29 | End: 2021-07-29 | Stop reason: SDUPTHER

## 2021-07-29 RX ORDER — DEXAMETHASONE SODIUM PHOSPHATE 4 MG/ML
4 INJECTION, SOLUTION INTRA-ARTICULAR; INTRALESIONAL; INTRAMUSCULAR; INTRAVENOUS; SOFT TISSUE ONCE
Status: COMPLETED | OUTPATIENT
Start: 2021-07-29 | End: 2021-07-29

## 2021-07-29 RX ORDER — ONDANSETRON 2 MG/ML
4 INJECTION INTRAMUSCULAR; INTRAVENOUS
Status: ACTIVE | OUTPATIENT
Start: 2021-07-29 | End: 2021-07-29

## 2021-07-29 RX ORDER — ASPIRIN 81 MG/1
81 TABLET, CHEWABLE ORAL DAILY
Status: DISCONTINUED | OUTPATIENT
Start: 2021-07-30 | End: 2021-07-30 | Stop reason: HOSPADM

## 2021-07-29 RX ORDER — HYDROMORPHONE HYDROCHLORIDE 1 MG/ML
0.25 INJECTION, SOLUTION INTRAMUSCULAR; INTRAVENOUS; SUBCUTANEOUS EVERY 5 MIN PRN
Status: DISCONTINUED | OUTPATIENT
Start: 2021-07-29 | End: 2021-07-30 | Stop reason: HOSPADM

## 2021-07-29 RX ORDER — LISINOPRIL 10 MG/1
10 TABLET ORAL DAILY
Status: DISCONTINUED | OUTPATIENT
Start: 2021-07-29 | End: 2021-07-30 | Stop reason: HOSPADM

## 2021-07-29 RX ORDER — ROCURONIUM BROMIDE 10 MG/ML
INJECTION, SOLUTION INTRAVENOUS PRN
Status: DISCONTINUED | OUTPATIENT
Start: 2021-07-29 | End: 2021-07-29 | Stop reason: SDUPTHER

## 2021-07-29 RX ORDER — FENTANYL CITRATE 50 UG/ML
INJECTION, SOLUTION INTRAMUSCULAR; INTRAVENOUS PRN
Status: DISCONTINUED | OUTPATIENT
Start: 2021-07-29 | End: 2021-07-29 | Stop reason: SDUPTHER

## 2021-07-29 RX ORDER — SODIUM CHLORIDE 0.9 % (FLUSH) 0.9 %
10 SYRINGE (ML) INJECTION PRN
Status: DISCONTINUED | OUTPATIENT
Start: 2021-07-29 | End: 2021-07-30 | Stop reason: HOSPADM

## 2021-07-29 RX ORDER — LIDOCAINE HYDROCHLORIDE 10 MG/ML
INJECTION, SOLUTION EPIDURAL; INFILTRATION; INTRACAUDAL; PERINEURAL PRN
Status: DISCONTINUED | OUTPATIENT
Start: 2021-07-29 | End: 2021-07-29 | Stop reason: SDUPTHER

## 2021-07-29 RX ORDER — SUCCINYLCHOLINE CHLORIDE 20 MG/ML
INJECTION INTRAMUSCULAR; INTRAVENOUS PRN
Status: DISCONTINUED | OUTPATIENT
Start: 2021-07-29 | End: 2021-07-29 | Stop reason: SDUPTHER

## 2021-07-29 RX ORDER — WARFARIN SODIUM 5 MG/1
5 TABLET ORAL DAILY
Status: DISCONTINUED | OUTPATIENT
Start: 2021-07-29 | End: 2021-07-30 | Stop reason: HOSPADM

## 2021-07-29 RX ORDER — OXYMETAZOLINE HYDROCHLORIDE 0.05 G/100ML
2 SPRAY NASAL 2 TIMES DAILY PRN
Status: DISCONTINUED | OUTPATIENT
Start: 2021-07-29 | End: 2021-07-30 | Stop reason: HOSPADM

## 2021-07-29 RX ORDER — GLYCOPYRROLATE 0.2 MG/ML
INJECTION INTRAMUSCULAR; INTRAVENOUS PRN
Status: DISCONTINUED | OUTPATIENT
Start: 2021-07-29 | End: 2021-07-29 | Stop reason: SDUPTHER

## 2021-07-29 RX ORDER — SODIUM CHLORIDE 0.9 % (FLUSH) 0.9 %
5-40 SYRINGE (ML) INJECTION EVERY 12 HOURS SCHEDULED
Status: DISCONTINUED | OUTPATIENT
Start: 2021-07-29 | End: 2021-07-29 | Stop reason: SDUPTHER

## 2021-07-29 RX ORDER — FENTANYL CITRATE 50 UG/ML
50 INJECTION, SOLUTION INTRAMUSCULAR; INTRAVENOUS EVERY 5 MIN PRN
Status: DISCONTINUED | OUTPATIENT
Start: 2021-07-29 | End: 2021-07-30 | Stop reason: HOSPADM

## 2021-07-29 RX ORDER — SODIUM CHLORIDE 0.9 % (FLUSH) 0.9 %
5-40 SYRINGE (ML) INJECTION PRN
Status: DISCONTINUED | OUTPATIENT
Start: 2021-07-29 | End: 2021-07-30 | Stop reason: HOSPADM

## 2021-07-29 RX ORDER — SODIUM CHLORIDE 0.9 % (FLUSH) 0.9 %
5-40 SYRINGE (ML) INJECTION EVERY 12 HOURS SCHEDULED
Status: DISCONTINUED | OUTPATIENT
Start: 2021-07-29 | End: 2021-07-30 | Stop reason: HOSPADM

## 2021-07-29 RX ORDER — DEXAMETHASONE SODIUM PHOSPHATE 10 MG/ML
INJECTION, SOLUTION INTRAMUSCULAR; INTRAVENOUS PRN
Status: DISCONTINUED | OUTPATIENT
Start: 2021-07-29 | End: 2021-07-29 | Stop reason: SDUPTHER

## 2021-07-29 RX ORDER — SODIUM CHLORIDE 9 MG/ML
25 INJECTION, SOLUTION INTRAVENOUS PRN
Status: DISCONTINUED | OUTPATIENT
Start: 2021-07-29 | End: 2021-07-30 | Stop reason: HOSPADM

## 2021-07-29 RX ORDER — SODIUM CHLORIDE 9 MG/ML
INJECTION, SOLUTION INTRAVENOUS CONTINUOUS
Status: DISCONTINUED | OUTPATIENT
Start: 2021-07-29 | End: 2021-07-30 | Stop reason: HOSPADM

## 2021-07-29 RX ORDER — FENOFIBRATE 160 MG/1
160 TABLET ORAL DAILY
Status: DISCONTINUED | OUTPATIENT
Start: 2021-07-29 | End: 2021-07-30 | Stop reason: HOSPADM

## 2021-07-29 RX ORDER — ACETAMINOPHEN 325 MG/1
650 TABLET ORAL EVERY 4 HOURS PRN
Status: DISCONTINUED | OUTPATIENT
Start: 2021-07-29 | End: 2021-07-30 | Stop reason: HOSPADM

## 2021-07-29 RX ORDER — PROPOFOL 10 MG/ML
INJECTION, EMULSION INTRAVENOUS PRN
Status: DISCONTINUED | OUTPATIENT
Start: 2021-07-29 | End: 2021-07-29 | Stop reason: SDUPTHER

## 2021-07-29 RX ORDER — PROPAFENONE HYDROCHLORIDE 150 MG/1
300 TABLET, FILM COATED ORAL EVERY 8 HOURS SCHEDULED
Status: DISCONTINUED | OUTPATIENT
Start: 2021-07-29 | End: 2021-07-30 | Stop reason: HOSPADM

## 2021-07-29 RX ORDER — CEFAZOLIN SODIUM 1 G/3ML
INJECTION, POWDER, FOR SOLUTION INTRAMUSCULAR; INTRAVENOUS PRN
Status: DISCONTINUED | OUTPATIENT
Start: 2021-07-29 | End: 2021-07-29 | Stop reason: SDUPTHER

## 2021-07-29 RX ORDER — SODIUM CHLORIDE 0.9 % (FLUSH) 0.9 %
5-40 SYRINGE (ML) INJECTION PRN
Status: DISCONTINUED | OUTPATIENT
Start: 2021-07-29 | End: 2021-07-29 | Stop reason: SDUPTHER

## 2021-07-29 RX ORDER — SODIUM CHLORIDE 0.9 % (FLUSH) 0.9 %
10 SYRINGE (ML) INJECTION EVERY 12 HOURS SCHEDULED
Status: DISCONTINUED | OUTPATIENT
Start: 2021-07-29 | End: 2021-07-30 | Stop reason: HOSPADM

## 2021-07-29 RX ORDER — HYDROMORPHONE HYDROCHLORIDE 1 MG/ML
0.5 INJECTION, SOLUTION INTRAMUSCULAR; INTRAVENOUS; SUBCUTANEOUS EVERY 5 MIN PRN
Status: DISCONTINUED | OUTPATIENT
Start: 2021-07-29 | End: 2021-07-30 | Stop reason: HOSPADM

## 2021-07-29 RX ORDER — RISPERIDONE 0.5 MG/1
0.5 TABLET, FILM COATED ORAL EVERY EVENING
Status: DISCONTINUED | OUTPATIENT
Start: 2021-07-29 | End: 2021-07-30 | Stop reason: HOSPADM

## 2021-07-29 RX ORDER — ONDANSETRON 2 MG/ML
INJECTION INTRAMUSCULAR; INTRAVENOUS PRN
Status: DISCONTINUED | OUTPATIENT
Start: 2021-07-29 | End: 2021-07-29 | Stop reason: SDUPTHER

## 2021-07-29 RX ORDER — CITALOPRAM 20 MG/1
40 TABLET ORAL DAILY
Status: DISCONTINUED | OUTPATIENT
Start: 2021-07-29 | End: 2021-07-30 | Stop reason: HOSPADM

## 2021-07-29 RX ORDER — SODIUM CHLORIDE 9 MG/ML
25 INJECTION, SOLUTION INTRAVENOUS PRN
Status: DISCONTINUED | OUTPATIENT
Start: 2021-07-29 | End: 2021-07-29 | Stop reason: SDUPTHER

## 2021-07-29 RX ORDER — HEPARIN SODIUM 1000 [USP'U]/ML
INJECTION, SOLUTION INTRAVENOUS; SUBCUTANEOUS PRN
Status: DISCONTINUED | OUTPATIENT
Start: 2021-07-29 | End: 2021-07-29 | Stop reason: SDUPTHER

## 2021-07-29 RX ADMIN — EPHEDRINE SULFATE 10 MG: 50 INJECTION INTRAMUSCULAR; INTRAVENOUS; SUBCUTANEOUS at 13:08

## 2021-07-29 RX ADMIN — LIDOCAINE HYDROCHLORIDE 50 MG: 10 INJECTION, SOLUTION EPIDURAL; INFILTRATION; INTRACAUDAL; PERINEURAL at 12:43

## 2021-07-29 RX ADMIN — SUCCINYLCHOLINE CHLORIDE 100 MG: 20 INJECTION, SOLUTION INTRAMUSCULAR; INTRAVENOUS at 12:44

## 2021-07-29 RX ADMIN — EPHEDRINE SULFATE 10 MG: 50 INJECTION INTRAMUSCULAR; INTRAVENOUS; SUBCUTANEOUS at 13:46

## 2021-07-29 RX ADMIN — WARFARIN SODIUM 5 MG: 5 TABLET ORAL at 21:38

## 2021-07-29 RX ADMIN — IOPAMIDOL 75 ML: 612 INJECTION, SOLUTION INTRAVENOUS at 14:27

## 2021-07-29 RX ADMIN — SODIUM CHLORIDE, PRESERVATIVE FREE 10 ML: 5 INJECTION INTRAVENOUS at 21:48

## 2021-07-29 RX ADMIN — PROTAMINE SULFATE 10 MG: 10 INJECTION, SOLUTION INTRAVENOUS at 14:20

## 2021-07-29 RX ADMIN — GLYCOPYRROLATE 0.2 MG: 0.2 INJECTION, SOLUTION INTRAMUSCULAR; INTRAVENOUS at 12:42

## 2021-07-29 RX ADMIN — DEXAMETHASONE SODIUM PHOSPHATE 4 MG: 4 INJECTION, SOLUTION INTRAMUSCULAR; INTRAVENOUS at 11:57

## 2021-07-29 RX ADMIN — Medication 1 TABLET: at 18:14

## 2021-07-29 RX ADMIN — DEXAMETHASONE SODIUM PHOSPHATE 8 MG: 10 INJECTION, SOLUTION INTRAMUSCULAR; INTRAVENOUS at 12:49

## 2021-07-29 RX ADMIN — ONDANSETRON HYDROCHLORIDE 4 MG: 2 SOLUTION INTRAMUSCULAR; INTRAVENOUS at 14:16

## 2021-07-29 RX ADMIN — EPHEDRINE SULFATE 20 MG: 50 INJECTION INTRAMUSCULAR; INTRAVENOUS; SUBCUTANEOUS at 12:55

## 2021-07-29 RX ADMIN — FENTANYL CITRATE 50 MCG: 50 INJECTION, SOLUTION INTRAMUSCULAR; INTRAVENOUS at 12:43

## 2021-07-29 RX ADMIN — SODIUM CHLORIDE: 9 INJECTION, SOLUTION INTRAVENOUS at 12:37

## 2021-07-29 RX ADMIN — PROPOFOL 50 MG: 10 INJECTION, EMULSION INTRAVENOUS at 12:45

## 2021-07-29 RX ADMIN — FAMOTIDINE 20 MG: 10 INJECTION, SOLUTION INTRAVENOUS at 11:57

## 2021-07-29 RX ADMIN — DOXAZOSIN 1 MG: 1 TABLET ORAL at 21:39

## 2021-07-29 RX ADMIN — ROCURONIUM BROMIDE 30 MG: 10 INJECTION, SOLUTION INTRAVENOUS at 12:53

## 2021-07-29 RX ADMIN — PROPAFENONE HYDROCHLORIDE 300 MG: 150 TABLET, FILM COATED ORAL at 18:11

## 2021-07-29 RX ADMIN — SODIUM CHLORIDE, PRESERVATIVE FREE 10 ML: 5 INJECTION INTRAVENOUS at 21:40

## 2021-07-29 RX ADMIN — EPHEDRINE SULFATE 10 MG: 50 INJECTION INTRAMUSCULAR; INTRAVENOUS; SUBCUTANEOUS at 13:02

## 2021-07-29 RX ADMIN — PROPOFOL 150 MG: 10 INJECTION, EMULSION INTRAVENOUS at 12:43

## 2021-07-29 RX ADMIN — PROTAMINE SULFATE 10 MG: 10 INJECTION, SOLUTION INTRAVENOUS at 14:19

## 2021-07-29 RX ADMIN — SODIUM CHLORIDE: 9 INJECTION, SOLUTION INTRAVENOUS at 14:17

## 2021-07-29 RX ADMIN — ROCURONIUM BROMIDE 20 MG: 10 INJECTION, SOLUTION INTRAVENOUS at 13:15

## 2021-07-29 RX ADMIN — PROPAFENONE HYDROCHLORIDE 300 MG: 150 TABLET, FILM COATED ORAL at 21:38

## 2021-07-29 RX ADMIN — CEFAZOLIN 2000 MG: 1 INJECTION, POWDER, FOR SOLUTION INTRAMUSCULAR; INTRAVENOUS; PARENTERAL at 12:49

## 2021-07-29 RX ADMIN — LISINOPRIL 10 MG: 10 TABLET ORAL at 18:11

## 2021-07-29 RX ADMIN — HEPARIN SODIUM 10000 UNITS: 1000 INJECTION INTRAVENOUS; SUBCUTANEOUS at 13:18

## 2021-07-29 RX ADMIN — EPHEDRINE SULFATE 10 MG: 50 INJECTION INTRAMUSCULAR; INTRAVENOUS; SUBCUTANEOUS at 13:25

## 2021-07-29 RX ADMIN — EPHEDRINE SULFATE 10 MG: 50 INJECTION INTRAMUSCULAR; INTRAVENOUS; SUBCUTANEOUS at 14:16

## 2021-07-29 RX ADMIN — SODIUM CHLORIDE: 9 INJECTION, SOLUTION INTRAVENOUS at 09:12

## 2021-07-29 RX ADMIN — SUGAMMADEX 300 MG: 100 INJECTION, SOLUTION INTRAVENOUS at 14:24

## 2021-07-29 ASSESSMENT — ENCOUNTER SYMPTOMS
BLOOD IN STOOL: 0
VOMITING: 0
COLOR CHANGE: 0
SHORTNESS OF BREATH: 0
WHEEZING: 0
NAUSEA: 0
FACIAL SWELLING: 0
SHORTNESS OF BREATH: 0
ABDOMINAL DISTENTION: 0
TROUBLE SWALLOWING: 0
COUGH: 0
EYE DISCHARGE: 0
EYE REDNESS: 0
ABDOMINAL PAIN: 0

## 2021-07-29 ASSESSMENT — LIFESTYLE VARIABLES: SMOKING_STATUS: 0

## 2021-07-29 ASSESSMENT — PAIN SCALES - GENERAL: PAINLEVEL_OUTOF10: 0

## 2021-07-29 NOTE — H&P
[facility])- Cardiology      OFFICE VISIT:  2021    Manuel Client - : 1945    Reason For Visit:  Princess Boo is a 76 y.o. male who is here for discussion regarding watchman procedure    HPI    Mr. Akiko Peres is a 76 y.o. male with history of hypertension, hyperlipidemia, thyroidectomy, former nicotine dependence, a family history of CAD, and proximal atrial fibrillation who presents with the chief complaint of six-month follow-up. Princess Boo has history of multiple episodes of bleeding on Coumadin, him and his wife mentioned that he has bleeding from his nose multiple times especially in the morning after he woke up from sleep, they changing bedsheets almost every day despite therapeutic INR  Also has bleeding in both his legs from the skin with the slightest scratch or trauma    He is here to discuss the option for left it appendage closure as alternative to blood thinners    He has no exertional chest pain, pressure, burning, tightness or squeezing. No symptomatic tachy- or quique-arrhythmia. No lightheadedness, dizziness, or syncope. No numbness or weakness to suggest cerebrovascular accident or transient ischemic attack. he reports no edema or shortness of breath. He denies orthopnea or paroxysmal nocturnal dyspnea. he is sleeping on 1 pillow at night. he has been compliant with his medications. his BP has been controlled at home. he reports no activity change. PCP follows lipids and labs. Lydia Leos MD is PCP.   Manuel Client has the following history as recorded in [facility]:    Patient Active Problem List    Diagnosis Date Noted    Longstanding persistent atrial fibrillation ([address]     Essential hypertension 2020    Abnormal EKG 2016    Sinoatrial node dysfunction ([address] 2016    Atrial flutter ([address]     Major depressive disorder, single episode, moderate ([address] 2016    Hypercholesteremia 2012     Past Medical History:   Diagnosis Date    Bradycardia     CAD (coronary artery disease)     cath 2015 diffuse disease through out the coronary tree, 30% RCA    Cancer [facility])     Thyroid cancer    Diverticulitis 2008    Hernia, umbilical 1917    History of bleeding ulcers     Hyperlipidemia     Hypertension     Hypogonadism male 2009    Hypothyroidism     PAF ([facility]oxysmal [address] fibrillation) [address] 5/4/15, 5/19/15    s/p cardioversion    Sinoatrial node dysfunction ([facility] 2016    3/12/2015  DCCV successful on lopressor 12.5 bid 2015  DCCV successful on rhythmol 150 tid 2015  DCCV successful on rhythmol 225 tid 2016  Recurrent atrial flutter 16  DCCV successful on rythmol 300 tid        Past Surgical History:   Procedure Laterality Date    CARDIAC CATHETERIZATION  2/19/15  JDT    EF 50%    CARDIOVERSION  5/4/15, 5/19/15    CHOLECYSTECTOMY  2010    CYST REMOVAL  1948    DENTAL SURGERY  2004    HERNIA REPAIR      JOINT REPLACEMENT Right     HIP    KNEE ARTHROPLASTY  2001    KNEE SURGERY  1963    MOUTH SURGERY  2020    THYROIDECTOMY  10/29/2018     Family History   Problem Relation Age of Onset    Heart Attack Father      Social History     Tobacco Use    Smoking status: Former Smoker     Types: Cigarettes     Quit date: 1997     Years since quittin.9    Smokeless tobacco: Never Used   Substance Use Topics    Alcohol use:  Yes     Alcohol/week: 0.0 standard drinks     Comment: minimal      Current Facility-Administered Medications   Medication Dose Route Frequency Provider Last Rate Last Admin    0.9 % sodium chloride infusion   Intravenous Continuous DYLON Camilo - CNP 75 mL/hr at 21 0912 New Bag at 21 0912    sodium chloride flush 0.9 % injection 5-40 mL  5-40 mL Intravenous 2 times per day DYLON Olsen - CNP        sodium chloride flush 0.9 % injection 5-40 mL  5-40 mL Intravenous PRN DYLON Cunha CNP        0.9 % sodium chloride Negative for dizziness, syncope, facial asymmetry and light-headedness. Hematological: Does not bruise/bleed easily. Psychiatric/Behavioral: Negative for behavioral problems and confusion. All other systems reviewed and are negative. Objective  Vital Signs - BP (!) 145/76   Pulse (!) 46   Temp 97 °F (36.1 °C) (Temporal)   Resp 14   Ht 6' 1\" (1.854 m)   Wt 210 lb (95.3 kg)   SpO2 100%   BMI 27.71 kg/m²   Physical Exam  Vitals and nursing note reviewed. Constitutional:       Appearance: He is well-developed. HENT:      Head: Normocephalic and atraumatic. Right Ear: External ear normal.      Left Ear: External ear normal.      Nose: Nose normal.   Eyes:      General:         Right eye: No discharge. Left eye: No discharge. Pupils: Pupils are equal, round, and reactive to light. Neck:      Vascular: No carotid bruit or JVD. Trachea: No tracheal deviation. Cardiovascular:      Rate and Rhythm: Regular rhythm. Bradycardia present. Heart sounds: Normal heart sounds. No murmur heard. No friction rub. No gallop. Pulmonary:      Effort: Pulmonary effort is normal. No respiratory distress. Breath sounds: No wheezing, rhonchi or rales. Abdominal:      General: Bowel sounds are normal. There is no distension. Palpations: Abdomen is soft. Tenderness: There is no abdominal tenderness. Musculoskeletal:         General: Normal range of motion. Cervical back: Normal range of motion. No edema. Skin:     General: Skin is warm and dry. Capillary Refill: Capillary refill takes less than 2 seconds. Findings: No rash. Neurological:      Mental Status: He is alert and oriented to person, place, and time.    Psychiatric:         Behavior: Behavior normal.         Judgment: Judgment normal.         Cardiac data:    ECG 07/29/21  Sinus bradycardia with left bundle branch block, nonspecific ST T wave abnormalities (unchanged from previous EKG)    QTc 0.442 ms    2/18/2015 TTE estimated EF 50%, physiologic pericardial effusion without obvious intracardiac mass  2/19/2015 cath luminary irregularities involving the entire coronary tree, 30% lesions in the RCA, small mid distal LAD without focal obstruction  3/12/2015 DCCV successful on Lopressor 12-1/2 twice daily  5/4/2015 DCCV successful on Rythmol 150, 3 times daily  5/19/2015 DCCV successful on Rythmol 225, 3 times daily  6/16/2016 recurrent atrial flutter  6/17/2016 DCCV successful on Rythmol 300, 3 times daily    ------------------      Assessment, Recommendations, & Plan:  76 y.o. male with history of atrial fibrillation on anticoagulation, history of hypertension and hyperlipidemia    Due to multiple nosebleeds episodes and skin bleeding on the lower extremity, he came in today for consultation regarding for the appendage closure device using watchman    Atrial Fibrillation, [id number] = 3    We had a long discussion with the patient and myself regarding the risks/benefits of stroke prophylaxis and anticoagulation using ACC guidelines and risk calculators. We discussed the options including no prophylaxis, aspirin only, DOACs, Warfarin, and mechanical occlusion of CHERRY (Watchman). The patient was able to comprehend their overall risk of stroke versus bleeding. The patient agreed that the patient was not a candidate for long-term anticoagulation due to the above issues. The patient is clinically a candidate for left atrial appendage occlusion using the Watchman device. Patient and family were informed of the risk/benefits of the procedure, the need for further imaging pre-procedure (NELSON/CTA) and post-procedure (NELSON) at specified intervals. Further, the patient understands that they will require anticoagulation before the procedure and after the procedure in the short-term (about 4-6 weeks). The patient is clinically a candidate for short-term anticoagulation.   Further, discussion regarding the possibility of CVA related to other etiologies other than afib were also discussed including intracranial disease, carotid disease, cerebral vascular malformation, aortic atheroma, non-CHERRY cardioembolic source, etc.      Patient understood that the WATCHMAN device is designed to reduce the risk of CVA in the setting of afib only and does not reduce the risk of CVA related to any other cause of stroke. All parties also understand that if post-procedure the patient develops another reason for anti-coagulation that this is a separate issue from the stroke prophylaxis for atrial fibrillation and that the device does not negate need for anticoagulation for other reasons. At this time, the only reason for anticoagulation is atrial fibrillation. All of the patient's/family's questions were answered to their satisfaction and they have decided to move forward with further work-up for the Watchman device. We will follow-up with the patient and primary cardiologist/caregivers throughout the process. Plan:  1) Pre-procedure imaging NELSON   2) Structural Heart Coordinator Blake Armendariz RN to follow-up to ensure work-up completed  3) Watchman information/brochure given  4) Continue all anticoagulation as Rx'ed till instructed otherwise by the Structural Heart Team      Please do not hesitate to contact me for any questions or concerns. Sincerely yours,    --------------    Addendum    Risks, benefits, alternatives of watchman left atrial appendage closure discussed with the patient   I explained the procedure to the patient in detail and fully informed consent obtained.   Acceptable Mallampati score, ASA 3  Consent for general anesthesia be obtained by Anesthesia team      Nancy Lopez MD, [facility]  Interventional Cardiologist, Endovascular Specialist   Medical Director, Olimpia Menezes

## 2021-07-29 NOTE — OP NOTE
Operative Note      Patient: Naomie Paulino  YOB: 1945  MRN: [id number]    Date of Procedure: 7/29/2021    ANESTHESIA: General     PRE-OP DIAGNOSIS: Atrial Fibrillation   POST-OP DIAGNOSIS: Atrial Fibrillation      PROCEDURE:   Watchman Flex left atrial appendage occlusion device placement     INDICATION FOR PROCEDURE:  Atrial Fibrillation & Stroke Prevention:  the patient is a 76years old male who was independently evaluated by a Nurse practitioner who recommended Watchman device placement to reduce risk of stroke/systemic embolism and deemed unsuitable for long-term oral anticoagulation. CHADS2 - VASc:  3    HAS- BLED score: 3  Candidacy for long-term oral anticoagulation is poor due:  Recurrent nose bleeding  The entire procedure of left atrial appendage occlusion with Watchman device was discussed with the patient and family in detail. All the questions were answered to their satisfaction. I quoted 1-2 percent risk of any major complications and 1 in 482 risk of device embolization. COMPLICATIONS: none   ESTIMATED BLOOD LOSS:  Less than 50 cc  CONTRAST: 75 cc  FLUOROSCOPY TIME: 27 min  CHERRY MEASUREMENTS: The widest diameter ostium 15 mm     FINDINGS:   No clot in the CHERRY     DESCRIPTION OF PROCEDURE:               After the consent, the patient was brought to the hybrid Cath operating room in the fasting and non-sedated state. Underwent induction of general anesthesia, and was prepped and draped in the usual sterile fashion. Dr. Fernando Power placed the NELSON probe, and assisted throughout the procedure with NELSON imaging. The right common femoral vein was accessed using US scan and a modified Seldinger technique. A 16-Kazakh hemostatic sheath was then placed for access under fluoroscopy guidance. Clarisse Segal used for Transseptal under NELSON guidance                  Upon crossing the septum, a bolus of heparin was given with goal ACT's of >300.                  Chioma Ramos wire was placed in the CHERRY. A pigtail catheter was placed through the watchman access sheath, and directed into the CHERRY. Left atrial pressure was recorded, next contrast injection and angiography was performed. The Anterior guiding catheter was positioned into the CHERRY, and after ensuring appropriate positioning of the guiding sheath, a 20 mm Watchman Flex device was deployed into the CHERRY under fluoroscopy. Position was stable. Anchoring was ensured with a tug test. Compression was  10-30 % of the original Watchman Flex size. Seal was evaluated with NELSON sweep, showing no significant leak. After confirmation of the above PASS criteria and review with the Watchman implant team, the device was released. Device position remained stable with NELSON and Fluoroscopy. Catheters and sheaths were removed, and the 8 figure suture was used to obtain hemostasis at the access site. The patient will be extubated and transferred to PACU for short recovery then to CV floor for overnight hospital stay. CONCLUSION:    Successful percutaneous closure of Left ATRIAL APPENDAGE using 20 mm WATCHMAN Flex DEVICE with no immediate complications. PLAN:  1. The patient will be monitored overnight on telemetry floor  2.  2D echo will be obtained in the morning to check for any pericardial effusion  3. The patient will begin short-term maintenance therapy with Coumadin  4. NELSON after 6 weeks prior to complete discontinuation of oral anti-coagulant Therapy.          Rj Monique MD, [facility] [place], [place]  Interventional Cardiologist, Endovascular Specialist   Medical Director, Structural Heart Program   [facility]    Electronically signed by Rj Monique MD on 7/29/2021 at 2:31 PM

## 2021-07-29 NOTE — PROGRESS NOTES
Patient's dentures taken out and placed in patient labeled denture cup. Denture cup given to patient's wife.   Electronically signed by Donnell Rojas RN on 7/29/2021 at 10:27 AM

## 2021-07-29 NOTE — ANESTHESIA PRE PROCEDURE
Current medications:    No current facility-administered medications for this visit. No current outpatient medications on file. Facility-Administered Medications Ordered in Other Visits   Medication Dose Route Frequency Provider Last Rate Last Admin    0.9 % sodium chloride infusion   Intravenous Continuous DYLON Price - CNP 75 mL/hr at 07/29/21 0912 New Bag at 07/29/21 0912    sodium chloride flush 0.9 % injection 5-40 mL  5-40 mL Intravenous 2 times per day DYLON Strong - CNP        sodium chloride flush 0.9 % injection 5-40 mL  5-40 mL Intravenous PRN DYLON Cunha - CNP        0.9 % sodium chloride infusion  25 mL Intravenous PRN DYLON Strong CNP           Allergies:     Allergies   Allergen Reactions    Doxycycline     Levofloxacin     Septra [Sulfamethoxazole-Trimethoprim]     Sulfa Antibiotics Itching       Problem List:    Patient Active Problem List   Diagnosis Code    Hypercholesteremia E78.00    Major depressive disorder, single episode, moderate (HCC) F32.1    Abnormal EKG R94.31    Sinoatrial node dysfunction (HCC) I49.5    Atrial flutter (HCC) I48.92    Essential hypertension I10    Longstanding persistent atrial fibrillation (HCC) I48.11       Past Medical History:        Diagnosis Date    Bradycardia     CAD (coronary artery disease)     cath 2015 diffuse disease through out the coronary tree, 30% RCA    Cancer [facility] [address]     Thyroid cancer    Diverticulitis 2008    Hernia, umbilical 8067    History of bleeding ulcers 1972/1973    Hyperlipidemia     Hypertension     Hypogonadism male 2009    Hypothyroidism     PAF (paroxysmal atrial fibrillation) ([facility] 5/4/15, 5/19/15    s/p cardioversion    Sinoatrial node dysfunction ([facility] 6/17/2016    3/12/2015  DCCV successful on lopressor 12.5 bid 05/04/2015  DCCV successful on rhythmol 150 tid 05/19/2015  DCCV successful on rhythmol 225 tid 6/16/2016  Recurrent atrial flutter 16  DCCV successful on rythmol 300 tid          Past Surgical History:        Procedure Laterality Date    CARDIAC CATHETERIZATION  2/19/15  JDT    EF 50%    CARDIOVERSION  5/4/15, 5/19/15    CHOLECYSTECTOMY  2010    CYST REMOVAL      DENTAL SURGERY  2004    HERNIA REPAIR  1948    JOINT REPLACEMENT Right     HIP    KNEE ARTHROPLASTY  2001    KNEE SURGERY  1963    MOUTH SURGERY  2020    THYROIDECTOMY  10/29/2018       Social History:    Social History     Tobacco Use    Smoking status: Former Smoker     Types: Cigarettes     Quit date: 1997     Years since quittin.9    Smokeless tobacco: Never Used   Substance Use Topics    Alcohol use: Yes     Alcohol/week: 0.0 standard drinks     Comment: minimal                                Counseling given: Not Answered      Vital Signs (Current): There were no vitals filed for this visit.                                            BP Readings from Last 3 Encounters:   21 (!) 145/76   20 (!) 143/85   20 (!) 189/88       NPO Status:                                                                                 BMI:   Wt Readings from Last 3 Encounters:   21 210 lb (95.3 kg)   20 220 lb (99.8 kg)   20 216 lb (98 kg)     There is no height or weight on file to calculate BMI.    CBC:   Lab Results   Component Value Date    WBC 6.0 2021    RBC 4.65 2021    HGB 15.1 2021    HCT 45.0 2021    MCV 96.8 2021    RDW 15.2 2021     2021       CMP:   Lab Results   Component Value Date     2021    K 4.1 2021     2021    CO2 26 2021    BUN 25 2021    CREATININE 1.8 2021    GFRAA 45 2021    LABGLOM 37 2021    GLUCOSE 104 2021    PROT 7.2 2021    CALCIUM 9.2 2021    BILITOT 0.6 2021    ALKPHOS 41 2021    AST 17 2021    ALT 17 2021       POC Tests: No results for input(s): POCGLU, POCNA, POCK, POCCL, POCBUN, POCHEMO, POCHCT in the last 72 hours. Coags:   Lab Results   Component Value Date    PROTIME 19.1 07/29/2021    INR 1.61 07/29/2021       HCG (If Applicable): No results found for: PREGTESTUR, PREGSERUM, HCG, HCGQUANT     ABGs: No results found for: PHART, PO2ART, PFV4LYL, OGL0KAI, BEART, Q8ZWIQXM     Type & Screen (If Applicable):  No results found for: LABABO, LABRH    Drug/Infectious Status (If Applicable):  No results found for: HIV, HEPCAB    COVID-19 Screening (If Applicable):   Lab Results   Component Value Date    COVID19 NOT DETECTED 11/16/2020         Anesthesia Evaluation  Patient summary reviewed no history of anesthetic complications:   Airway: Mallampati: I  TM distance: >3 FB   Neck ROM: full  Mouth opening: > = 3 FB Dental:    (+) edentulous      Pulmonary:normal exam  breath sounds clear to auscultation      (-) asthma, shortness of breath, recent URI, sleep apnea and not a current smoker          Patient did not smoke on day of surgery. Cardiovascular:  Exercise tolerance: good (>4 METS),   (+) hypertension:, CAD:, dysrhythmias: atrial fibrillation, hyperlipidemia    (-) pacemaker, past MI, CABG/stent and  angina    ECG reviewed  Rhythm: irregular  Rate: normal  Echocardiogram reviewed         Beta Blocker:  Not on Beta Blocker         Neuro/Psych:   (+) depression/anxiety    (-) seizures, TIA and CVA           GI/Hepatic/Renal:   (+) renal disease (Stage III): CRI,      (-) GERD and liver disease       Endo/Other:    (+) hypothyroidism, blood dyscrasia (Last dose warfarin 7/26/21): anticoagulation therapy:., .    (-) diabetes mellitus, hyperthyroidism        Pt had PAT visit. Abdominal:             Vascular: Other Findings:               Anesthesia Plan      general     ASA 3     (Preop famotidine)  Induction: intravenous.   NELSON  MIPS: Postoperative opioids intended and Prophylactic antiemetics

## 2021-07-29 NOTE — ANESTHESIA POSTPROCEDURE EVALUATION
Department of Anesthesiology  Postprocedure Note    Patient: Mercy Colin  MRN: [id number]  YOB: 1945  Date of evaluation: 7/29/2021  Time:  2:40 PM     Procedure Summary     Date: 07/29/21 Room / Location: [facility] CATH LAB    Anesthesia Start: 1237 Anesthesia Stop:     Procedure: CATH LAB WITH ANESTHESIA Diagnosis:       Paroxysmal atrial fibrillation      Atrial fibrillation ([address]    Scheduled Providers: DYLON Veronica CRNA Responsible Provider: DYLON Veronica CRNA    Anesthesia Type: general ASA Status: 3          Anesthesia Type: No value filed. Carolin Phase I:      Carolin Phase II:      Last vitals: Reviewed and per EMR flowsheets.        Anesthesia Post Evaluation    Patient location during evaluation: PACU  Patient participation: complete - patient cannot participate  Level of consciousness: obtunded/minimal responses  Pain score: 0  Airway patency: patent  Nausea & Vomiting: no nausea and no vomiting  Complications: no  Cardiovascular status: hemodynamically stable  Respiratory status: nasal cannula  Hydration status: stable

## 2021-07-30 VITALS
WEIGHT: 214 LBS | SYSTOLIC BLOOD PRESSURE: 111 MMHG | RESPIRATION RATE: 20 BRPM | HEIGHT: 73 IN | TEMPERATURE: 97.7 F | BODY MASS INDEX: 28.36 KG/M2 | OXYGEN SATURATION: 96 % | HEART RATE: 48 BPM | DIASTOLIC BLOOD PRESSURE: 62 MMHG

## 2021-07-30 PROBLEM — Z95.818 PRESENCE OF WATCHMAN LEFT ATRIAL APPENDAGE CLOSURE DEVICE: Status: ACTIVE | Noted: 2021-07-30

## 2021-07-30 LAB
ANION GAP SERPL CALCULATED.3IONS-SCNC: 14 MMOL/L (ref 7–19)
BASOPHILS ABSOLUTE: 0 K/UL (ref 0–0.2)
BASOPHILS RELATIVE PERCENT: 0.2 % (ref 0–1)
BLOOD BANK DISPENSE STATUS: NORMAL
BLOOD BANK DISPENSE STATUS: NORMAL
BLOOD BANK PRODUCT CODE: NORMAL
BLOOD BANK PRODUCT CODE: NORMAL
BPU ID: NORMAL
BPU ID: NORMAL
BUN BLDV-MCNC: 20 MG/DL (ref 8–23)
CALCIUM SERPL-MCNC: 8.9 MG/DL (ref 8.8–10.2)
CHLORIDE BLD-SCNC: 103 MMOL/L (ref 98–111)
CO2: 20 MMOL/L (ref 22–29)
CREAT SERPL-MCNC: 1.9 MG/DL (ref 0.5–1.2)
DESCRIPTION BLOOD BANK: NORMAL
DESCRIPTION BLOOD BANK: NORMAL
EKG P AXIS: 44 DEGREES
EKG P-R INTERVAL: 198 MS
EKG Q-T INTERVAL: 526 MS
EKG QRS DURATION: 152 MS
EKG QTC CALCULATION (BAZETT): 500 MS
EKG T AXIS: -104 DEGREES
EOSINOPHILS ABSOLUTE: 0 K/UL (ref 0–0.6)
EOSINOPHILS RELATIVE PERCENT: 0 % (ref 0–5)
GFR AFRICAN AMERICAN: 42
GFR NON-AFRICAN AMERICAN: 35
GLUCOSE BLD-MCNC: 122 MG/DL (ref 74–109)
HCT VFR BLD CALC: 44.5 % (ref 42–52)
HEMOGLOBIN: 14.5 G/DL (ref 14–18)
IMMATURE GRANULOCYTES #: 0 K/UL
LV EF: 60 %
LVEF MODALITY: NORMAL
LYMPHOCYTES ABSOLUTE: 0.7 K/UL (ref 1.1–4.5)
LYMPHOCYTES RELATIVE PERCENT: 7.3 % (ref 20–40)
MCH RBC QN AUTO: 32.2 PG (ref 27–31)
MCHC RBC AUTO-ENTMCNC: 32.6 G/DL (ref 33–37)
MCV RBC AUTO: 98.7 FL (ref 80–94)
MONOCYTES ABSOLUTE: 0.4 K/UL (ref 0–0.9)
MONOCYTES RELATIVE PERCENT: 4.5 % (ref 0–10)
NEUTROPHILS ABSOLUTE: 7.8 K/UL (ref 1.5–7.5)
NEUTROPHILS RELATIVE PERCENT: 87.6 % (ref 50–65)
PDW BLD-RTO: 15.5 % (ref 11.5–14.5)
PLATELET # BLD: 203 K/UL (ref 130–400)
PMV BLD AUTO: 10.2 FL (ref 9.4–12.4)
POTASSIUM SERPL-SCNC: 4.4 MMOL/L (ref 3.5–5)
RBC # BLD: 4.51 M/UL (ref 4.7–6.1)
SODIUM BLD-SCNC: 137 MMOL/L (ref 136–145)
WBC # BLD: 9 K/UL (ref 4.8–10.8)

## 2021-07-30 PROCEDURE — 93308 TTE F-UP OR LMTD: CPT

## 2021-07-30 PROCEDURE — 93010 ELECTROCARDIOGRAM REPORT: CPT | Performed by: INTERNAL MEDICINE

## 2021-07-30 PROCEDURE — 6370000000 HC RX 637 (ALT 250 FOR IP): Performed by: INTERNAL MEDICINE

## 2021-07-30 PROCEDURE — 2580000003 HC RX 258: Performed by: ANESTHESIOLOGY

## 2021-07-30 PROCEDURE — 99239 HOSP IP/OBS DSCHRG MGMT >30: CPT | Performed by: INTERNAL MEDICINE

## 2021-07-30 PROCEDURE — 36415 COLL VENOUS BLD VENIPUNCTURE: CPT

## 2021-07-30 PROCEDURE — 80048 BASIC METABOLIC PNL TOTAL CA: CPT

## 2021-07-30 PROCEDURE — 85025 COMPLETE CBC W/AUTO DIFF WBC: CPT

## 2021-07-30 RX ADMIN — ASPIRIN 81 MG: 81 TABLET, CHEWABLE ORAL at 09:21

## 2021-07-30 RX ADMIN — PROPAFENONE HYDROCHLORIDE 300 MG: 150 TABLET, FILM COATED ORAL at 09:18

## 2021-07-30 RX ADMIN — DOXAZOSIN 1 MG: 1 TABLET ORAL at 09:22

## 2021-07-30 RX ADMIN — SODIUM CHLORIDE, PRESERVATIVE FREE 10 ML: 5 INJECTION INTRAVENOUS at 09:23

## 2021-07-30 RX ADMIN — LEVOTHYROXINE SODIUM 175 MCG: 125 TABLET ORAL at 09:18

## 2021-07-30 RX ADMIN — Medication 1 TABLET: at 09:23

## 2021-07-30 RX ADMIN — FENOFIBRATE 160 MG: 160 TABLET ORAL at 09:23

## 2021-07-30 RX ADMIN — LISINOPRIL 10 MG: 10 TABLET ORAL at 09:20

## 2021-07-30 NOTE — PLAN OF CARE
Problem: Falls - Risk of:  Goal: Will remain free from falls  Outcome: Ongoing  Goal: Absence of physical injury  Outcome: Ongoing     Problem: Falls - Risk of:  Goal: Will remain free from falls  Outcome: Ongoing  Goal: Absence of physical injury  Outcome: Ongoing

## 2021-07-30 NOTE — PROGRESS NOTES
Amalia Perez arrived to room # [phone]. Presented with: watchman placement  Mental Status: Patient is oriented, alert, coherent, logical, thought processes intact and able to concentrate and follow conversation. Vitals:    07/29/21 1847   BP: 130/66   Pulse: 71   Resp: 18   Temp: 98 °F (36.7 °C)   SpO2: 94%     Patient safety contract and falls prevention contract reviewed with patient Yes. Oriented Patient to room. Call light within reach. Yes.   Needs, issues or concerns expressed at this time: no.      Electronically signed by Jarad Fernandez RN on 7/29/2021 at 9:25 PM

## 2021-07-30 NOTE — PROGRESS NOTES
4 Eyes Skin Assessment    Dilia Bird is being assessed upon: Admission    I agree that Lola Gonzalez, TONY, along with *** (either 2 RN's or 1 LPN and 1 RN) have performed a thorough Head to Toe Skin Assessment on the patient. ALL assessment sites listed below have been assessed. Areas assessed by both nurses:     [x]   Head, Face, and Ears   [x]   Shoulders, Back, and Chest  [x]   Arms, Elbows, and Hands   [x]   Coccyx, Sacrum, and Ischium  [x]   Legs, Feet, and Heels    Does the Patient have Skin Breakdown?  No    Noel Prevention initiated: {YES/NO:[id number]}  Wound Care Orders initiated: No    [facility] nurse consulted for Pressure Injury (Stage 3,4, Unstageable, DTI, NWPT, and Complex wounds) and New or Established Ostomies: No        Primary Nurse eSignature: Aurea Mars RN on 7/29/2021 at 9:26 PM      Co-Signer eSignature: {Esignature:[id number]}

## 2021-08-02 ENCOUNTER — TELEPHONE (OUTPATIENT)
Dept: CARDIOLOGY CLINIC | Age: 76
End: 2021-08-02

## 2021-08-03 NOTE — TELEPHONE ENCOUNTER
Called and spoke with patient, have NELSON scheduled for 8/30/21 at 1000 with arrival of 800. Patient is to be NPO after midnight. Patient instructed to arrive through front entrance of hospital and make immediate left. Patient advised they can have one person with them but they both must wear a mask. Patient advised may take morning medications with sip of water. Patient has had COVID vaccine and it has been at least two weeks since last injection. Patient to bring COVID vaccine card to have scanned into chart if not already there. Patient does not have IV dye allergy. Patient verbally understood.

## 2021-08-06 NOTE — DISCHARGE SUMMARY
Discharge Summary    Mayra Beckwith  :  1945  MRN:  [id number]    Admit date:  2021  Discharge date:  2021    Admitting Physician:  Brandon Guevara MD    Advance Directive: Prior    Consults: none    Primary Care Physician:  Oumou Lopez MD    Discharge Diagnoses: Active Problems:    Presence of Watchman left atrial appendage closure device    Atrial fibrillation (HCC)  Resolved Problems:    * No resolved hospital problems. *      Problem List:   Patient Active Problem List    Diagnosis Date Noted    Presence of Watchman left atrial appendage closure device 2021     Priority: High    Atrial fibrillation ([address] 2021     Priority: Medium    Longstanding persistent atrial fibrillation ([address]     Essential hypertension 2020    Abnormal EKG 2016     Overview Note:     5/10/2010  ECG  NSR with inferior and anterior-lateral T wave inversion  2011  Echo  Normal  2015 echo  Normal LVFX, RVSP 28 [facility])  2015 lexiscan anterior lateral and inferior defects  [facility])  2015 cath  Mild CAD, small LAD, normal LVFX      Sinoatrial node dysfunction ([address] 2016     Overview Note:     3/12/2015  DCCV successful on lopressor 12.5 bid  2015  DCCV successful on rhythmol 150 tid  2015  DCCV successful on rhythmol 225 tid  2016  Recurrent atrial flutter  16  DCCV successful on rythmol 300 tid            Atrial flutter ([address]     Major depressive disorder, single episode, moderate ([address] 2016    Hypercholesteremia 2012     Overview Note:     Takes Konsyl Psyllium, a fiber derivative, for cholesterol.          Cardiology Specific Data:  Specialty Problems          Cardiology Problems    Atrial fibrillation [facility])        Hypercholesteremia        Atrial flutter (HCC)        Sinoatrial node dysfunction (HCC)        Essential hypertension        Longstanding persistent atrial fibrillation (HCC)                Significant Diagnostic Studies: Echocardiogram transesophageal    Result Date: 7/29/2021  Transesophageal Echocardiography Report (NELSON)   Demographics   Patient Name  Rowdy Obrien Date of Study         07/29/2021   MRN           [id number]        Gender                Male   Date of Birth 1945    Room Number           [id number]   Age           76 year(s)   Height:       72 inches     Referring Physician   Zeynep Edmondson MD   Weight:       210 pounds    Sonographer   BSA:          2.18 m^2      Interpreting          [facility]                              Physician   BMI:          28.48 kg/m^2  Procedure Type of Study   NELSON procedure:ECHOCARDIOGRAM TRANSESOPHAGEAL. Conclusions   Summary  Status post 20 mm Watchman FLX left atrial appendage closure device with  no immediate complications. There is no evidence for cheri-device or  intra-device leak. Normal left ventricular size with preserved LV function and an estimated  ejection fraction of approximately 60%. No evidence of left ventricular mass or thrombus noted. Normal size left atrium. No evidence of thrombus within left atrium or CHERRY. Normal right ventricular size with preserved RV function. No evidence of mitral valve stenosis. Mild mitral regurgitation. No evidence of significant pericardial effusion is noted. Small iatrogenic ASD s/p interatrial puncture. Signature   ----------------------------------------------------------------  Electronically signed by Darien OrnelasInterpreting physician)  on 07/29/2021 02:56 PM  ----------------------------------------------------------------   Findings   Mitral Valve  No evidence of mitral valve stenosis. Mild mitral regurgitation. Aortic Valve  Aortic valve appears to be tricuspid. Structurally normal aortic valve. No significant aortic regurgitation or stenosis is noted. Tricuspid Valve  Tricuspid valve is structurally normal.  No evidence of tricuspid regurgitation.    Pulmonic Valve  The pulmonic valve was not well visualized. Left Atrium  Normal size left atrium. No evidence of thrombus within left atrium or CHERRY. Left Ventricle  Normal left ventricular size with preserved LV function and an estimated  ejection fraction of approximately 60%. No evidence of left ventricular mass or thrombus noted. Right Atrium  Normal right atrial dimension with no evidence of thrombus or mass noted. Right Ventricle  Normal right ventricular size with preserved RV function. Pericardial Effusion  No evidence of significant pericardial effusion is noted. Miscellaneous  Aortic root is within normal limits. Allergies   - Septra:Reaction - Skin ->eruptions; Severity - Moderate;Sensitivity -     Adverse Reaction.   - Doxycyline:Reaction - Stomach ->nausea; Severity - Moderate;Sensitivity -     Adverse Reaction.   - Other allergy:Reaction - Head ->headache ;Severity -     Moderate;Sensitivity - Allergy(Levofloxin). - Sulfa drugs. ECHO 2D WO COLOR DOPPLER COMPLETE    Result Date: 7/30/2021  Transthoracic Echocardiography Report (TTE)  Demographics   Patient Name  Margi WALKER Date of Study         07/30/2021   MRN           [id number]        Gender                Male   Date of Birth 1945    Room Number           L-0718   Age           76 year(s)   Height:       72 inches     Referring Physician   Alix Negro MD   Weight:       210 pounds    Sonographer           Irene Owens [facility]   BSA:          2.18 m^2      Interpreting          Mirna Norwood                              Physician   BMI:          28.48 kg/m^2  Procedure Type of Study   TTE procedure:ECHO 2D W/DOPPLER/CONTRAST LIMITD. Study Location: Echo Lab Technical Quality: Adequate visualization Patient Status: Inpatient BP: 104/60 mmHg Indications:S/P Watchman procedure. Conclusions   Summary  Normal left ventricular size with preserved LV function and an estimated  ejection fraction of approximately 60%. No color flow Doppler evidence for ASD.   Normal right ventricular size with preserved RV function. No significant valvular regurgitation. No evidence of significant pericardial effusion is noted. Stable findings when compared to study dated 7/29/2021. Signature   ----------------------------------------------------------------  Electronically signed by Jen Denver Simone(Interpreting physician)  on 07/30/2021 09:35 AM  ----------------------------------------------------------------   Findings   Mitral Valve  Structurally normal mitral valve with normal leaflet mobility. No evidence  of mitral valve stenosis. Trivial mitral regurgitation. Aortic Valve  Aortic valve appears to be tricuspid. No significant aortic regurgitation or stenosis is noted. Left Atrium  Normal size left atrium. No color flow Doppler evidence for ASD. Left Ventricle  Normal left ventricular size with preserved LV function and an estimated  ejection fraction of approximately 60%. Right Atrium  Normal right atrial dimension with no evidence of thrombus or mass noted. Right Ventricle  Normal right ventricular size with preserved RV function. Pericardial Effusion  No evidence of significant pericardial effusion is noted. Miscellaneous  IVC normal.  Allergies   - Septra:Reaction - Skin ->eruptions; Severity - Moderate;Sensitivity -     Adverse Reaction.   - Doxycyline:Reaction - Stomach ->nausea; Severity - Moderate;Sensitivity -     Adverse Reaction.   - Other allergy:Reaction - Head ->headache ;Severity -     Moderate;Sensitivity - Allergy(Levofloxin). - Sulfa drugs.  M-Mode Measurements (cm)   LVIDd: 5.06 cm                                  LVIDs: 3.37 cm  IVSd: 1.17 cm  LVPWd: 1.17 cm  % Ejection Fraction: 62 %        Pertinent Labs:   CBC:   Lab Results   Component Value Date    WBC 9.0 07/30/2021    HGB 14.5 07/30/2021    HCT 44.5 07/30/2021    MCV 98.7 (H) 07/30/2021     07/30/2021     BMP:    Lab Results   Component Value Date     07/30/2021    K 4.4 07/30/2021  07/30/2021    CO2 20 07/30/2021    BUN 20 07/30/2021    CREATININE 1.9 07/30/2021    GLUCOSE 122 07/30/2021    CALCIUM 8.9 07/30/2021      INR: No results for input(s): INR in the last 72 hours. Lipids: No results for input(s): CHOL, HDL in the last 72 hours. Invalid input(s): LDLCALCU  ABGs:No results for input(s): PHART, LAJ4IOR, PO2ART, LXW9LVF, BEART, HGBAE, I2RGOLAK, CARBOXHGBART, 02THERAPY in the last 72 hours. HgBA1c:  No results for input(s): LABA1C in the last 72 hours. Procedures:   Operative Note        Patient: Harika Sanchez  YOB: 1945  MRN: [id number]     Date of Procedure: 7/29/2021     ANESTHESIA: General     PRE-OP DIAGNOSIS: Atrial Fibrillation   POST-OP DIAGNOSIS: Atrial Fibrillation      PROCEDURE:   Watchman Flex left atrial appendage occlusion device placement     INDICATION FOR PROCEDURE:  Atrial Fibrillation & Stroke Prevention:  the patient is a 76years old male who was independently evaluated by a Nurse practitioner who recommended Watchman device placement to reduce risk of stroke/systemic embolism and deemed unsuitable for long-term oral anticoagulation. CHADS2 - VASc:  3    HAS- BLED score: 3  Candidacy for long-term oral anticoagulation is poor due:  Recurrent nose bleeding  The entire procedure of left atrial appendage occlusion with Watchman device was discussed with the patient and family in detail. All the questions were answered to their satisfaction. I quoted 1-2 percent risk of any major complications and 1 in 130 risk of device embolization. COMPLICATIONS: none   ESTIMATED BLOOD LOSS:  Less than 50 cc  CONTRAST: 75 cc  FLUOROSCOPY TIME: 27 min  CHERRY MEASUREMENTS: The widest diameter ostium 15 mm     FINDINGS:   No clot in the CHERRY     DESCRIPTION OF PROCEDURE:               After the consent, the patient was brought to the hybrid Cath operating room in the fasting and non-sedated state.   Underwent induction of general anesthesia, and was prepped and draped in the usual sterile fashion. Dr. Fausto Hussein placed the NELSON probe, and assisted throughout the procedure with NELSON imaging. The right common femoral vein was accessed using US scan and a modified Seldinger technique. A 16-Ukrainian hemostatic sheath was then placed for access under fluoroscopy guidance. Raynald Shouts used for Transseptal under NELSON guidance                  Upon crossing the septum, a bolus of heparin was given with goal ACT's of >300. An Balyis wire was placed in the CHERRY. A pigtail catheter was placed through the watchman access sheath, and directed into the CHERRY. Left atrial pressure was recorded, next contrast injection and angiography was performed. The Anterior guiding catheter was positioned into the CHERRY, and after ensuring appropriate positioning of the guiding sheath, a 20 mm Watchman Flex device was deployed into the CHERRY under fluoroscopy. Position was stable. Anchoring was ensured with a tug test. Compression was  10-30 % of the original Watchman Flex size. Seal was evaluated with NELSON sweep, showing no significant leak. After confirmation of the above PASS criteria and review with the Watchman implant team, the device was released. Device position remained stable with NELSON and Fluoroscopy. Catheters and sheaths were removed, and the 8 figure suture was used to obtain hemostasis at the access site. The patient will be extubated and transferred to PACU for short recovery then to CV floor for overnight hospital stay. CONCLUSION:    Successful percutaneous closure of Left ATRIAL APPENDAGE using 20 mm WATCHMAN Flex DEVICE with no immediate complications. PLAN:  1. The patient will be monitored overnight on telemetry floor  2.  2D echo will be obtained in the morning to check for any pericardial effusion  3. The patient will begin short-term maintenance therapy with Coumadin  4.  NELSON after 6 weeks prior to complete discontinuation of oral anti-coagulant Therapy. Darrel Pedro MD, [facility]  Interventional Cardiologist, Endovascular Specialist   Medical Director, Structural Heart Program   [facility]  ------------------------------------------------    Physical Exam:    Vital Signs: /62   Pulse (!) 48   Temp 97.7 °F (36.5 °C) (Temporal)   Resp 20   Ht 6' 1\" (1.854 m)   Wt 214 lb (97.1 kg)   SpO2 96%   BMI 28.23 kg/m²     Constitutional:       Appearance: He is well-developed. HENT:      Head: Normocephalic and atraumatic. Right Ear: External ear normal.      Left Ear: External ear normal.      Nose: Nose normal.   Eyes:      General:         Right eye: No discharge. Left eye: No discharge. Pupils: Pupils are equal, round, and reactive to light. Neck:      Vascular: No carotid bruit or JVD. Trachea: No tracheal deviation. Cardiovascular:      Rate and Rhythm: Regular rhythm. Bradycardia present. Heart sounds: Normal heart sounds. No murmur heard. No friction rub. No gallop. Pulmonary:      Effort: Pulmonary effort is normal. No respiratory distress. Breath sounds: No wheezing, rhonchi or rales. Abdominal:      General: Bowel sounds are normal. There is no distension. Palpations: Abdomen is soft. Tenderness: There is no abdominal tenderness. Musculoskeletal:         General: Normal range of motion. Cervical back: Normal range of motion. No edema. Skin:     General: Skin is warm and dry. Capillary Refill: Capillary refill takes less than 2 seconds. Findings: No rash. Neurological:      Mental Status: He is alert and oriented to person, place, and time.    Psychiatric:         Behavior: Behavior normal.         Judgment: Judgment normal.          Discharge Medications:       [place] [address] [employer] Medication Instructions KYO:[id number]    Printed on:08/08/21 3099 Medication Information                        alclomethasone (ACLOVATE) 0.05 % cream  Apply topically as needed              aspirin 81 MG tablet  Take 81 mg by mouth daily             BIOTIN PO  Take 1 tablet by mouth daily             citalopram (CELEXA) 40 MG tablet  Take 40 mg by mouth daily             fenofibrate 160 MG tablet  Take 160 mg by mouth daily             levothyroxine (SYNTHROID) 175 MCG tablet  Take 175 mcg by mouth Daily             lisinopril (PRINIVIL;ZESTRIL) 10 MG tablet  Take 10 mg by mouth daily             Multiple Vitamins-Minerals (THERAPEUTIC MULTIVITAMIN-MINERALS) tablet  Take 1 tablet by mouth daily             oxymetazoline (AFRIN NASAL SPRAY) 0.05 % nasal spray  Spray 2 sprays to affected nostril as needed for nosebleed             propafenone (RYTHMOL) 300 MG tablet  TAKE ONE TABLET BY MOUTH EVERY 8 HOURS.             risperiDONE (RISPERDAL) 0.5 MG tablet  Take 0.5 mg by mouth every evening             sodium chloride (OCEAN NASAL SPRAY) 0.65 % nasal spray  2 sprays by Nasal route as needed for Congestion             terazosin (HYTRIN) 1 MG capsule  Take 1 mg by mouth nightly              TESTOSTERONE IM  Inject 400 mg into the muscle once a week Every friday             warfarin (COUMADIN) 5 MG tablet  TAKE ONE TABLET BY MOUTH DAILY. Discharge Instructions:     Sophia Norwood will call with NELSON appointment    Kaela Enamorado MD  [facility] [address] [phone]    Schedule an appointment as soon as possible for a visit in 1 week  Take medications as directed. Resume activity as tolerated.     Diet: No diet orders on file     Disposition: Patient is medically stable and will be discharged *    Electronically signed by Julienne Da Silva MD on 8/8/2021 at 5:35 PM    35 minutes spent on documenting and completing discharge    Julienne Da Silva MD, [facility] [place] [address] Cardiologist, Endovascular Specialist   Medical Director, Structural Heart Program   [facility]

## 2021-08-30 ENCOUNTER — HOSPITAL ENCOUNTER (OUTPATIENT)
Dept: CARDIAC CATH/INVASIVE PROCEDURES | Age: 76
Discharge: HOME | End: 2021-08-30
Attending: INTERNAL MEDICINE | Admitting: INTERNAL MEDICINE
Payer: MEDICARE

## 2021-08-30 VITALS
BODY MASS INDEX: 27.83 KG/M2 | RESPIRATION RATE: 16 BRPM | OXYGEN SATURATION: 93 % | DIASTOLIC BLOOD PRESSURE: 68 MMHG | SYSTOLIC BLOOD PRESSURE: 117 MMHG | WEIGHT: 210 LBS | TEMPERATURE: 97.3 F | HEIGHT: 73 IN | HEART RATE: 49 BPM

## 2021-08-30 LAB
LV EF: 60 %
LVEF MODALITY: NORMAL

## 2021-08-30 PROCEDURE — 93325 DOPPLER ECHO COLOR FLOW MAPG: CPT

## 2021-08-30 PROCEDURE — 93321 DOPPLER ECHO F-UP/LMTD STD: CPT

## 2021-08-30 PROCEDURE — 99152 MOD SED SAME PHYS/QHP 5/>YRS: CPT

## 2021-08-30 PROCEDURE — 93312 ECHO TRANSESOPHAGEAL: CPT

## 2021-08-30 PROCEDURE — 2580000003 HC RX 258: Performed by: INTERNAL MEDICINE

## 2021-08-30 PROCEDURE — 6370000000 HC RX 637 (ALT 250 FOR IP)

## 2021-08-30 PROCEDURE — 6360000002 HC RX W HCPCS

## 2021-08-30 RX ORDER — SODIUM CHLORIDE 9 MG/ML
25 INJECTION, SOLUTION INTRAVENOUS PRN
Status: DISCONTINUED | OUTPATIENT
Start: 2021-08-30 | End: 2021-08-30 | Stop reason: HOSPADM

## 2021-08-30 RX ORDER — SODIUM CHLORIDE 0.9 % (FLUSH) 0.9 %
5-40 SYRINGE (ML) INJECTION EVERY 12 HOURS SCHEDULED
Status: DISCONTINUED | OUTPATIENT
Start: 2021-08-30 | End: 2021-08-30

## 2021-08-30 RX ORDER — SODIUM CHLORIDE 0.9 % (FLUSH) 0.9 %
5-40 SYRINGE (ML) INJECTION PRN
Status: DISCONTINUED | OUTPATIENT
Start: 2021-08-30 | End: 2021-08-30 | Stop reason: HOSPADM

## 2021-08-30 RX ORDER — SODIUM CHLORIDE 9 MG/ML
25 INJECTION, SOLUTION INTRAVENOUS PRN
Status: DISCONTINUED | OUTPATIENT
Start: 2021-08-30 | End: 2021-08-30

## 2021-08-30 RX ORDER — ACETAMINOPHEN 325 MG/1
650 TABLET ORAL EVERY 4 HOURS PRN
Status: DISCONTINUED | OUTPATIENT
Start: 2021-08-30 | End: 2021-08-30 | Stop reason: HOSPADM

## 2021-08-30 RX ORDER — SODIUM CHLORIDE 0.9 % (FLUSH) 0.9 %
5-40 SYRINGE (ML) INJECTION EVERY 12 HOURS SCHEDULED
Status: DISCONTINUED | OUTPATIENT
Start: 2021-08-30 | End: 2021-08-30 | Stop reason: HOSPADM

## 2021-08-30 RX ORDER — SODIUM CHLORIDE 0.9 % (FLUSH) 0.9 %
5-40 SYRINGE (ML) INJECTION PRN
Status: DISCONTINUED | OUTPATIENT
Start: 2021-08-30 | End: 2021-08-30

## 2021-08-30 RX ORDER — SODIUM CHLORIDE 9 MG/ML
INJECTION, SOLUTION INTRAVENOUS CONTINUOUS
Status: DISCONTINUED | OUTPATIENT
Start: 2021-08-30 | End: 2021-08-30 | Stop reason: HOSPADM

## 2021-08-30 RX ADMIN — SODIUM CHLORIDE: 9 INJECTION, SOLUTION INTRAVENOUS at 08:51

## 2021-08-30 ASSESSMENT — ENCOUNTER SYMPTOMS
EYE DISCHARGE: 0
ABDOMINAL PAIN: 0
TROUBLE SWALLOWING: 0
FACIAL SWELLING: 0
BLOOD IN STOOL: 0
EYE REDNESS: 0
WHEEZING: 0
VOMITING: 0
COUGH: 0
ABDOMINAL DISTENTION: 0
NAUSEA: 0
SHORTNESS OF BREATH: 0
COLOR CHANGE: 0

## 2021-08-30 NOTE — H&P
[facility])- Cardiology      OFFICE VISIT:  2021    Joi Chen - : 1945    Reason For Visit:  Lalo Leiva is a 68 y.o. male who is here for discussion regarding watchman procedure    HPI    Mr. Rowdy Anders is a 68 y.o. male with history of hypertension, hyperlipidemia, thyroidectomy, former nicotine dependence, a family history of CAD, and proximal atrial fibrillation who presents with the chief complaint of six-month follow-up. Lalo Leiva has history of multiple episodes of bleeding on Coumadin, him and his wife mentioned that he has bleeding from his nose multiple times especially in the morning after he woke up from sleep, they changing bedsheets almost every day despite therapeutic INR  Also has bleeding in both his legs from the skin with the slightest scratch or trauma    He is here to discuss the option for left it appendage closure as alternative to blood thinners    He has no exertional chest pain, pressure, burning, tightness or squeezing. No symptomatic tachy- or quique-arrhythmia. No lightheadedness, dizziness, or syncope. No numbness or weakness to suggest cerebrovascular accident or transient ischemic attack. he reports no edema or shortness of breath. He denies orthopnea or paroxysmal nocturnal dyspnea. he is sleeping on 1 pillow at night. he has been compliant with his medications. his BP has been controlled at home. he reports no activity change. PCP follows lipids and labs. Lenka Olvera MD is PCP.   Joi Chen has the following history as recorded in [facility]:    Patient Active Problem List    Diagnosis Date Noted    Presence of Watchman left atrial appendage closure device 2021    Atrial fibrillation ([address] 2021    Longstanding persistent atrial fibrillation ([address]     Essential hypertension 2020    Abnormal EKG 2016    Sinoatrial node dysfunction ([address] 2016    Atrial flutter ([address]     Major depressive disorder, single episode, moderate ([address] 2016    Hypercholesteremia 2012     Past Medical History:   Diagnosis Date    Bradycardia     CAD (coronary artery disease)     cath  diffuse disease through out the coronary tree, 30% RCA    Cancer ([facility]     Thyroid cancer    Diverticulitis     Hernia, umbilical 0770    History of bleeding ulcers /    Hyperlipidemia     Hypertension     Hypogonadism male     Hypothyroidism     PAF (paroxysmal atrial fibrillation) ([facility] 5/4/15, 5/19/15    s/p cardioversion    Sinoatrial node dysfunction ([facility] 2016    3/12/2015  DCCV successful on lopressor 12.5 bid 2015  DCCV successful on rhythmol 150 tid 2015  DCCV successful on rhythmol 225 tid 2016  Recurrent atrial flutter 16  DCCV successful on rythmol 300 tid        Past Surgical History:   Procedure Laterality Date    CARDIAC CATHETERIZATION  2/19/15  JDT    EF 50%    CARDIOVERSION  5/4/15, 5/19/15    CHOLECYSTECTOMY  2010    CYST REMOVAL  1948    DENTAL SURGERY  2004    HERNIA REPAIR      JOINT REPLACEMENT Right     HIP    KNEE ARTHROPLASTY  2001    KNEE SURGERY  1963    MOUTH SURGERY  2020    THYROIDECTOMY  10/29/2018     Family History   Problem Relation Age of Onset    Heart Attack Father      Social History     Tobacco Use    Smoking status: Former Smoker     Types: Cigarettes     Quit date: 1997     Years since quittin.0    Smokeless tobacco: Never Used   Substance Use Topics    Alcohol use:  Yes     Alcohol/week: 0.0 standard drinks     Comment: minimal      Current Facility-Administered Medications   Medication Dose Route Frequency Provider Last Rate Last Admin    0.9 % sodium chloride infusion   IntraVENous Continuous Rudy Quintana MD        sodium chloride flush 0.9 % injection 5-40 mL  5-40 mL IntraVENous 2 times per day Rudy Quintana MD        sodium chloride flush 0.9 % injection 5-40 mL  5-40 mL IntraVENous PRN Abdelkader Khushboo Hazel MD        0.9 % sodium chloride infusion  25 mL IntraVENous PRN Girish Fabian MD         Allergies: Doxycycline, Levofloxacin, Septra [sulfamethoxazole-trimethoprim], and Sulfa antibiotics    Review of Systems  Review of Systems   Constitutional: Negative for activity change, diaphoresis, fatigue, fever and unexpected weight change. HENT: Positive for nosebleeds. Negative for facial swelling and trouble swallowing. Eyes: Negative for discharge, redness and visual disturbance. Respiratory: Negative for cough, shortness of breath and wheezing. Cardiovascular: Negative for chest pain, palpitations and leg swelling. Gastrointestinal: Negative for abdominal distention, abdominal pain, blood in stool, nausea and vomiting. Endocrine: Negative for cold intolerance and heat intolerance. Genitourinary: Negative for dysuria and hematuria. Musculoskeletal: Negative for gait problem. Skin: Negative for color change, pallor and rash. Neurological: Negative for dizziness, syncope, facial asymmetry and light-headedness. Hematological: Does not bruise/bleed easily. Psychiatric/Behavioral: Negative for behavioral problems and confusion. All other systems reviewed and are negative. Objective  Vital Signs - /77   Pulse (!) 46   Temp 97.3 °F (36.3 °C) (Temporal)   Resp 9   Ht 6' 1\" (1.854 m)   Wt 210 lb (95.3 kg)   SpO2 99%   BMI 27.71 kg/m²   Physical Exam  Vitals and nursing note reviewed. Constitutional:       Appearance: He is well-developed. HENT:      Head: Normocephalic and atraumatic. Right Ear: External ear normal.      Left Ear: External ear normal.      Nose: Nose normal.   Eyes:      General:         Right eye: No discharge. Left eye: No discharge. Pupils: Pupils are equal, round, and reactive to light. Neck:      Vascular: No carotid bruit or JVD. Trachea: No tracheal deviation. Cardiovascular:      Rate and Rhythm: Regular rhythm. Bradycardia present. Heart sounds: Normal heart sounds. No murmur heard. No friction rub. No gallop. Pulmonary:      Effort: Pulmonary effort is normal. No respiratory distress. Breath sounds: No wheezing, rhonchi or rales. Abdominal:      General: Bowel sounds are normal. There is no distension. Palpations: Abdomen is soft. Tenderness: There is no abdominal tenderness. Musculoskeletal:         General: Normal range of motion. Cervical back: Normal range of motion. No edema. Skin:     General: Skin is warm and dry. Capillary Refill: Capillary refill takes less than 2 seconds. Findings: No rash. Neurological:      Mental Status: He is alert and oriented to person, place, and time.    Psychiatric:         Behavior: Behavior normal.         Judgment: Judgment normal.         Cardiac data:    ECG 08/30/21  Sinus bradycardia with left bundle branch block, nonspecific ST T wave abnormalities (unchanged from previous EKG)    QTc 0.442 ms    2/18/2015 TTE estimated EF 50%, physiologic pericardial effusion without obvious intracardiac mass  2/19/2015 cath luminary irregularities involving the entire coronary tree, 30% lesions in the RCA, small mid distal LAD without focal obstruction  3/12/2015 DCCV successful on Lopressor 12-1/2 twice daily  5/4/2015 DCCV successful on Rythmol 150, 3 times daily  5/19/2015 DCCV successful on Rythmol 225, 3 times daily  6/16/2016 recurrent atrial flutter  6/17/2016 DCCV successful on Rythmol 300, 3 times daily    ------------------      Assessment, Recommendations, & Plan:  68 y.o. male with history of atrial fibrillation on anticoagulation, history of hypertension and hyperlipidemia    Due to multiple nosebleeds episodes and skin bleeding on the lower extremity, he came in today for consultation regarding for the appendage closure device using watchman    Atrial Fibrillation, [id number] = 3    We had a long discussion with the patient and myself regarding the risks/benefits of stroke prophylaxis and anticoagulation using ACC guidelines and risk calculators. We discussed the options including no prophylaxis, aspirin only, DOACs, Warfarin, and mechanical occlusion of CHERRY (Watchman). The patient was able to comprehend their overall risk of stroke versus bleeding. The patient agreed that the patient was not a candidate for long-term anticoagulation due to the above issues. The patient is clinically a candidate for left atrial appendage occlusion using the Watchman device. Patient and family were informed of the risk/benefits of the procedure, the need for further imaging pre-procedure (NELSON/CTA) and post-procedure (NELSON) at specified intervals. Further, the patient understands that they will require anticoagulation before the procedure and after the procedure in the short-term (about 4-6 weeks). The patient is clinically a candidate for short-term anticoagulation. Further, discussion regarding the possibility of CVA related to other etiologies other than afib were also discussed including intracranial disease, carotid disease, cerebral vascular malformation, aortic atheroma, non-CHERRY cardioembolic source, etc.      Patient understood that the WATCHMAN device is designed to reduce the risk of CVA in the setting of afib only and does not reduce the risk of CVA related to any other cause of stroke. All parties also understand that if post-procedure the patient develops another reason for anti-coagulation that this is a separate issue from the stroke prophylaxis for atrial fibrillation and that the device does not negate need for anticoagulation for other reasons. At this time, the only reason for anticoagulation is atrial fibrillation. All of the patient's/family's questions were answered to their satisfaction and they have decided to move forward with further work-up for the Watchman device.   We will follow-up with the patient and primary

## 2021-08-31 PROCEDURE — 93312 ECHO TRANSESOPHAGEAL: CPT | Performed by: INTERNAL MEDICINE

## 2021-08-31 PROCEDURE — 93325 DOPPLER ECHO COLOR FLOW MAPG: CPT | Performed by: INTERNAL MEDICINE

## 2021-11-12 DIAGNOSIS — I48.92 ATRIAL FLUTTER, UNSPECIFIED TYPE (HCC): ICD-10-CM

## 2021-11-15 RX ORDER — PROPAFENONE HYDROCHLORIDE 300 MG/1
TABLET, COATED ORAL
Qty: 270 TABLET | Refills: 1 | Status: SHIPPED | OUTPATIENT
Start: 2021-11-15 | End: 2022-09-13 | Stop reason: SDUPTHER

## 2022-03-04 ENCOUNTER — OFFICE VISIT (OUTPATIENT)
Dept: CARDIOLOGY CLINIC | Age: 77
End: 2022-03-04
Payer: MEDICARE

## 2022-03-04 VITALS
DIASTOLIC BLOOD PRESSURE: 70 MMHG | HEIGHT: 72 IN | BODY MASS INDEX: 28.44 KG/M2 | SYSTOLIC BLOOD PRESSURE: 121 MMHG | WEIGHT: 210 LBS

## 2022-03-04 DIAGNOSIS — Z95.818 PRESENCE OF WATCHMAN LEFT ATRIAL APPENDAGE CLOSURE DEVICE: Primary | ICD-10-CM

## 2022-03-04 PROCEDURE — G8484 FLU IMMUNIZE NO ADMIN: HCPCS | Performed by: INTERNAL MEDICINE

## 2022-03-04 PROCEDURE — G8417 CALC BMI ABV UP PARAM F/U: HCPCS | Performed by: INTERNAL MEDICINE

## 2022-03-04 PROCEDURE — G8427 DOCREV CUR MEDS BY ELIG CLIN: HCPCS | Performed by: INTERNAL MEDICINE

## 2022-03-04 PROCEDURE — 4040F PNEUMOC VAC/ADMIN/RCVD: CPT | Performed by: INTERNAL MEDICINE

## 2022-03-04 PROCEDURE — 99214 OFFICE O/P EST MOD 30 MIN: CPT | Performed by: INTERNAL MEDICINE

## 2022-03-04 PROCEDURE — 1036F TOBACCO NON-USER: CPT | Performed by: INTERNAL MEDICINE

## 2022-03-04 PROCEDURE — 1123F ACP DISCUSS/DSCN MKR DOCD: CPT | Performed by: INTERNAL MEDICINE

## 2022-03-04 RX ORDER — ACETAMINOPHEN 160 MG
TABLET,DISINTEGRATING ORAL
COMMUNITY

## 2022-03-04 ASSESSMENT — ENCOUNTER SYMPTOMS
EYE REDNESS: 0
VOMITING: 0
WHEEZING: 0
ABDOMINAL PAIN: 0
COUGH: 0
BLOOD IN STOOL: 0
NAUSEA: 0
SHORTNESS OF BREATH: 0
EYE DISCHARGE: 0
FACIAL SWELLING: 0
COLOR CHANGE: 0
TROUBLE SWALLOWING: 0
ABDOMINAL DISTENTION: 0

## 2022-03-04 NOTE — PROGRESS NOTES
[facility])- Cardiology      OFFICE VISIT:  3/4/2022    [address] [place] [address] Avenue: 1945    Reason For Visit:  Ysabel Joy is a 68 y.o. male who is here for f/u 6 months after watchman procedure    HPI    Mr. Carroll is a 68 y.o. male with history of hypertension, hyperlipidemia, thyroidectomy, former nicotine dependence, a family history of CAD, and proximal atrial fibrillation who presents with the chief complaint of six-month follow-up status post watchman procedure. Watchman done in July 2021 using 20 mm watchman Flex device with excellent results, follow-up NELSON was performed and showed no peridevice leak    Patient reports doing well with no active shortness of breath or chest pain or palpitation, he denies any further bleeding he takes aspirin alone    PCP follows lipids and labs. Gordo Lind MD is PCP.   Brii Boggs has the following [facility] as [facility] in [facility]:    Patient Active Problem List    Diagnosis Date Noted    Presence of Watchman left atrial appendage closure device 07/30/2021    Atrial fibrillation ([address] 07/29/2021    Longstanding persistent atrial fibrillation ([address]     Essential hypertension 07/21/2020    Abnormal EKG 06/17/2016    Sinoatrial node dysfunction ([address] 06/17/2016    Atrial flutter ([address]     Major depressive disorder, single episode, moderate ([address] 02/22/2016    Hypercholesteremia 08/22/2012     Past Medical History:   Diagnosis Date    Bradycardia     CAD (coronary artery disease)     cath 2015 diffuse disease through out the coronary tree, 30% RCA    Cancer ([address]     Thyroid cancer    Diverticulitis 2008    Hernia, umbilical 2235    History of bleeding ulcers 1972/1973    Hyperlipidemia     Hypertension     Hypogonadism male 2009    Hypothyroidism     PAF (paroxysmal atrial fibrillation) ([address] 5/4/15, 5/19/15    s/p cardioversion    Sinoatrial node dysfunction ([address] 6/17/2016    3/12/2015  DCCV successful on lopressor 12.5 bid 05/04/2015  DCCV successful on rhythmol 150 tid 2015  DCCV successful on rhythmol 225 tid 2016  Recurrent atrial flutter 16  DCCV successful on rythmol 300 tid        Past Surgical History:   Procedure Laterality Date    CARDIAC CATHETERIZATION  2/19/15  JDT    EF 50%    CARDIOVERSION  5/4/15, 5/19/15    CHOLECYSTECTOMY  2010    CYST REMOVAL      DENTAL SURGERY  2004    HERNIA REPAIR  1948    JOINT REPLACEMENT Right     HIP    KNEE ARTHROPLASTY  2001    KNEE SURGERY  1963    MOUTH SURGERY  2020    THYROIDECTOMY  10/29/2018     Family History   Problem Relation Age of Onset    Heart Attack Father      Social History     Tobacco Use    Smoking status: Former Smoker     Types: Cigarettes     Quit date: 1997     Years since quittin.5    Smokeless tobacco: Never Used   Substance Use Topics    Alcohol use:  Yes     Alcohol/week: 0.0 standard drinks     Comment: minimal      Current Outpatient Medications   Medication Sig Dispense Refill    Cholecalciferol (VITAMIN D3) 50 MCG (2000 UT) CAPS Take by mouth      propafenone (RYTHMOL) 300 MG tablet TAKE ONE TABLET BY MOUTH EVERY 8 HOURS. 270 tablet 1    mupirocin (BACTROBAN) 2 % ointment mupirocin 2 % topical ointment      levothyroxine (SYNTHROID) 175 MCG tablet Take 175 mcg by mouth Daily      lisinopril (PRINIVIL;ZESTRIL) 10 MG tablet Take 10 mg by mouth daily      BIOTIN PO Take 1 tablet by mouth daily      alclomethasone (ACLOVATE) 0.05 % cream Apply topically as needed       sodium chloride (OCEAN NASAL SPRAY) 0.65 % nasal spray 2 sprays by Nasal route as needed for Congestion 1 Bottle 3    oxymetazoline (AFRIN NASAL SPRAY) 0.05 % nasal spray Spray 2 sprays to affected nostril as needed for nosebleed 1 Bottle 3    citalopram (CELEXA) 40 MG tablet Take 40 mg by mouth nightly   1    aspirin 81 MG tablet Take 81 mg by mouth daily      risperiDONE (RISPERDAL) 0.5 MG tablet Take 0.5 mg by mouth every evening      fenofibrate 160 MG tablet Take 160 mg by mouth daily      Multiple Vitamins-Minerals (THERAPEUTIC MULTIVITAMIN-MINERALS) tablet Take 1 tablet by mouth daily      terazosin (HYTRIN) 1 MG capsule Take 1 mg by mouth nightly       TESTOSTERONE IM Inject 400 mg into the muscle once a week Every friday       No current facility-administered medications for this visit. Allergies: Doxycycline, Levofloxacin, Septra [sulfamethoxazole-trimethoprim], and Sulfa antibiotics    Review of Systems  Review of Systems   Constitutional: Negative for activity change, diaphoresis, fatigue, fever and unexpected weight change. HENT: Negative for facial swelling, nosebleeds and trouble swallowing. Eyes: Negative for discharge, redness and visual disturbance. Respiratory: Negative for cough, shortness of breath and wheezing. Cardiovascular: Negative for chest pain, palpitations and leg swelling. Gastrointestinal: Negative for abdominal distention, abdominal pain, blood in stool, nausea and vomiting. Endocrine: Negative for cold intolerance and heat intolerance. Genitourinary: Negative for dysuria and hematuria. Musculoskeletal: Negative for gait problem. Skin: Negative for color change, pallor and rash. Neurological: Negative for dizziness, syncope, facial asymmetry and light-headedness. Hematological: Does not bruise/bleed easily. Psychiatric/Behavioral: Negative for behavioral problems and confusion. All other systems reviewed and are negative. Objective  Vital Signs - /70   Ht 6' (1.829 m)   Wt 210 lb (95.3 kg)   BMI 28.48 kg/m²   Physical Exam  Vitals and nursing note reviewed. Constitutional:       Appearance: He is well-developed. HENT:      Head: Normocephalic and atraumatic. Right Ear: External ear normal.      Left Ear: External ear normal.      Nose: Nose normal.   Eyes:      General:         Right eye: No discharge. Left eye: No discharge.       Pupils: Pupils are equal, round, and reactive to light. Neck:      Vascular: No carotid bruit or JVD. Trachea: No tracheal deviation. Cardiovascular:      Rate and Rhythm: Regular rhythm. Bradycardia present. Heart sounds: Normal heart sounds. No murmur heard. No friction rub. No gallop. Pulmonary:      Effort: Pulmonary effort is normal. No respiratory distress. Breath sounds: No wheezing, rhonchi or rales. Abdominal:      General: Bowel sounds are normal. There is no distension. Palpations: Abdomen is soft. Tenderness: There is no abdominal tenderness. Musculoskeletal:         General: Normal range of motion. Cervical back: Normal range of motion. No edema. Skin:     General: Skin is warm and dry. Capillary Refill: Capillary refill takes less than 2 seconds. Findings: No rash. Neurological:      Mental Status: He is alert and oriented to person, place, and time. Psychiatric:         Behavior: Behavior normal.         Judgment: Judgment normal.         Cardiac data:    ECG 03/04/22  Sinus bradycardia with left bundle branch block, nonspecific ST T wave abnormalities (unchanged from previous EKG)    QTc 0.442 ms    2/18/2015 TTE estimated EF 50%, physiologic pericardial effusion without obvious intracardiac mass  2/19/2015 cath luminary irregularities involving the entire coronary tree, 30% lesions in the RCA, small mid distal LAD without focal obstruction  3/12/2015 DCCV successful on Lopressor 12-1/2 twice daily  5/4/2015 DCCV successful on Rythmol 150, 3 times daily  5/19/2015 DCCV successful on Rythmol 225, 3 times daily  6/16/2016 recurrent atrial flutter  6/17/2016 DCCV successful on Rythmol 300, 3 times daily  July 9, 2021-patient had watchman flex 20 mm under NELSON guidance  Follow-up NELSON was performed in August 30, 2021 showed excellent watchman placement with no leak  ---------------   NELSON procedure:ECHOCARDIOGRAM TRANSESOPHAGEAL.       Conclusions      Summary   No be errors in the transcription that are not intended.

## 2022-06-16 ENCOUNTER — HOSPITAL ENCOUNTER (OUTPATIENT)
Dept: NUCLEAR MEDICINE | Age: 77
Discharge: HOME | End: 2022-06-18
Payer: MEDICARE

## 2022-06-16 DIAGNOSIS — M89.9 BONE DISEASE: ICD-10-CM

## 2022-06-16 PROCEDURE — 3430000000 HC RX DIAGNOSTIC RADIOPHARMACEUTICAL: Performed by: NURSE PRACTITIONER

## 2022-06-16 PROCEDURE — 78306 BONE IMAGING WHOLE BODY: CPT | Performed by: RADIOLOGY

## 2022-06-16 PROCEDURE — A9561 TC99M OXIDRONATE: HCPCS | Performed by: NURSE PRACTITIONER

## 2022-06-16 PROCEDURE — 78306 BONE IMAGING WHOLE BODY: CPT

## 2022-06-16 RX ADMIN — TECHNETIUM TC 99M OXIDRONATE 21.6 MILLICURIE: 3.15 INJECTION, POWDER, LYOPHILIZED, FOR SOLUTION INTRAVENOUS at 15:22

## 2022-09-13 ENCOUNTER — OFFICE VISIT (OUTPATIENT)
Dept: CARDIOLOGY CLINIC | Age: 77
End: 2022-09-13
Payer: MEDICARE

## 2022-09-13 VITALS
HEIGHT: 72 IN | HEART RATE: 61 BPM | DIASTOLIC BLOOD PRESSURE: 80 MMHG | SYSTOLIC BLOOD PRESSURE: 120 MMHG | BODY MASS INDEX: 29.12 KG/M2 | OXYGEN SATURATION: 97 % | WEIGHT: 215 LBS

## 2022-09-13 DIAGNOSIS — I48.92 ATRIAL FLUTTER, UNSPECIFIED TYPE (HCC): ICD-10-CM

## 2022-09-13 DIAGNOSIS — I48.19 PERSISTENT ATRIAL FIBRILLATION (HCC): Primary | ICD-10-CM

## 2022-09-13 PROCEDURE — G8427 DOCREV CUR MEDS BY ELIG CLIN: HCPCS | Performed by: INTERNAL MEDICINE

## 2022-09-13 PROCEDURE — 93000 ELECTROCARDIOGRAM COMPLETE: CPT | Performed by: INTERNAL MEDICINE

## 2022-09-13 PROCEDURE — 1123F ACP DISCUSS/DSCN MKR DOCD: CPT | Performed by: INTERNAL MEDICINE

## 2022-09-13 PROCEDURE — G8417 CALC BMI ABV UP PARAM F/U: HCPCS | Performed by: INTERNAL MEDICINE

## 2022-09-13 PROCEDURE — 99215 OFFICE O/P EST HI 40 MIN: CPT | Performed by: INTERNAL MEDICINE

## 2022-09-13 PROCEDURE — 1036F TOBACCO NON-USER: CPT | Performed by: INTERNAL MEDICINE

## 2022-09-13 RX ORDER — PROPAFENONE HYDROCHLORIDE 300 MG/1
300 TABLET, COATED ORAL 2 TIMES DAILY
Qty: 60 TABLET | Refills: 3 | Status: SHIPPED | OUTPATIENT
Start: 2022-09-13

## 2022-09-13 NOTE — PROGRESS NOTES
Nenita Muse is a 68 y.o. male presents with paroxysmal atrial fibrillation status post watchman procedure. He has been feeling well. He recently had his propafenone decreased to 300 twice daily due to bradycardia in the 40s. He felt fatigued at that time. Now his heart rate is improved and he feels better. Other than that, he is minimally symptomatic with atrial fibrillation unless his rates are very high. Review of Systems   Constitutional: Negative for fever, chills, diaphoresis, activity change, appetite change, fatigue and unexpected weight change. Eyes: Negative for photophobia, pain, redness and visual disturbance. Respiratory: Negative for apnea, cough, chest tightness, shortness of breath, wheezing and stridor. Cardiovascular: Negative for chest pain, palpitations and leg swelling. Gastrointestinal: Negative for abdominal distention. Genitourinary: Negative for dysuria, urgency and frequency. Musculoskeletal: Negative for myalgias, arthralgias and gait problem. Skin: Negative for color change, pallor, rash and wound. Neurological: Negative for dizziness, tremors, speech difficulty, weakness and numbness. Hematological: Does not bruise/bleed easily. Psychiatric/Behavioral: Negative. Social History     Socioeconomic History    Marital status:      Spouse name: Not on file    Number of children: Not on file    Years of education: Not on file    Highest education level: Not on file   Occupational History    Not on file   Tobacco Use    Smoking status: Former     Types: Cigarettes     Quit date: 1997     Years since quittin.0    Smokeless tobacco: Never   Vaping Use    Vaping Use: Never used   Substance and Sexual Activity    Alcohol use:  Yes     Alcohol/week: 0.0 standard drinks     Comment: minimal    Drug use: No    Sexual activity: Not on file   Other Topics Concern    Not on file   Social History Narrative    Not on file     Social Determinants of Health     Financial Resource Strain: Not on file   Food Insecurity: Not on file   Transportation Needs: Not on file   Physical Activity: Not on file   Stress: Not on file   Social Connections: Not on file   Intimate Partner Violence: Not on file   Housing Stability: Not on file       Allergies   Allergen Reactions    Doxycycline     Levofloxacin     Septra [Sulfamethoxazole-Trimethoprim]     Sulfa Antibiotics Itching         Current Outpatient Medications:     Cholecalciferol (VITAMIN D3) 50 MCG (2000 UT) CAPS, Take by mouth, Disp: , Rfl:     propafenone (RYTHMOL) 300 MG tablet, TAKE ONE TABLET BY MOUTH EVERY 8 HOURS.  (Patient taking differently: Take by mouth in the morning and at bedtime), Disp: 270 tablet, Rfl: 1    mupirocin (BACTROBAN) 2 % ointment, mupirocin 2 % topical ointment, Disp: , Rfl:     levothyroxine (SYNTHROID) 175 MCG tablet, Take 175 mcg by mouth Daily, Disp: , Rfl:     lisinopril (PRINIVIL;ZESTRIL) 10 MG tablet, Take 10 mg by mouth daily, Disp: , Rfl:     BIOTIN PO, Take 1 tablet by mouth daily, Disp: , Rfl:     alclomethasone (ACLOVATE) 0.05 % cream, Apply topically as needed , Disp: , Rfl:     sodium chloride (OCEAN NASAL SPRAY) 0.65 % nasal spray, 2 sprays by Nasal route as needed for Congestion, Disp: 1 Bottle, Rfl: 3    oxymetazoline (AFRIN NASAL SPRAY) 0.05 % nasal spray, Spray 2 sprays to affected nostril as needed for nosebleed, Disp: 1 Bottle, Rfl: 3    citalopram (CELEXA) 40 MG tablet, Take 40 mg by mouth nightly , Disp: , Rfl: 1    aspirin 81 MG tablet, Take 81 mg by mouth daily, Disp: , Rfl:     risperiDONE (RISPERDAL) 0.5 MG tablet, Take 0.5 mg by mouth every evening, Disp: , Rfl:     fenofibrate 160 MG tablet, Take 160 mg by mouth daily, Disp: , Rfl:     Multiple Vitamins-Minerals (THERAPEUTIC MULTIVITAMIN-MINERALS) tablet, Take 1 tablet by mouth daily, Disp: , Rfl:     terazosin (HYTRIN) 1 MG capsule, Take 1 mg by mouth nightly , Disp: , Rfl:     TESTOSTERONE IM, Inject 400 mg into the muscle once a week Every friday, Disp: , Rfl:     PE:  Vitals:    09/13/22 1027   BP: 120/80   Pulse: 61   SpO2: 97%   Weight: 215 lb (97.5 kg)   Height: 6' (1.829 m)       Estimated body mass index is 29.16 kg/m² as calculated from the following:    Height as of this encounter: 6' (1.829 m). Weight as of this encounter: 215 lb (97.5 kg). Constitutional: He is oriented to person, place, and time. He appears well-developed and well-nourished in no acute distress. Neck:  Neck supple without JVD present. No trachea deviation present. No thyromegaly present. Eyes:Conjunctivae and EOM are normal. Pupils equal and reactive to light. ENT:Hearing appears normal.conjunctiva and lids are normal, ears and nose appear normal.  Cardiovascular: Normal rate, S1S2 regular rhythm, normal heart sounds. No murmur ascultated. No gallop and no friction rub. No carotid bruits. No peripheral edema. Pulmonary/Chest:  Lungs clear to auscultation bilaterally without evidence of respiratory distress. He without wheezes. He without rales or ronchi. Musculoskeletal: Normal range of motion. Gait is normal  Head is normocephalic and atraumatic. Skin: Skin is warm and dry without rash or pallor. Psychiatric:He is alert and oriented to person, place, and time. He has a normal mood and affect. His behavior is normal. Thought content normal.       Lab Results   Component Value Date/Time    CREATININE 1.9 07/30/2021 02:06 AM    CREATININE 1.8 07/29/2021 08:44 AM    CREATININE 1.9 12/23/2020 09:58 AM    HGB 14.5 07/30/2021 02:06 AM    HGB 15.1 07/29/2021 06:02 PM    HGB 15.1 07/29/2021 08:44 AM           ECG 09/13/22    Sinus rhythm with left anterior fascicular block         Assessment, Recommendations, & Plan:  68 y.o. male with paroxysmal atrial fibrillation. He is now off of anticoagulation and feels well.   His rhythm is well controlled on propafenone 300 twice daily and I will make no changes to his regimen. Disposition - RTC in 6 months or sooner if needed      Please do not hesitate to contact me for any questions or concerns. Dr. Simi Diaz.  [place]  Electrophysiology and Cardiology  [facility] [place], Cardiology  [phone]

## 2022-11-28 DIAGNOSIS — I48.92 ATRIAL FLUTTER, UNSPECIFIED TYPE (HCC): ICD-10-CM

## 2022-11-28 RX ORDER — PROPAFENONE HYDROCHLORIDE 300 MG/1
TABLET, COATED ORAL
Qty: 60 TABLET | Refills: 3 | Status: SHIPPED | OUTPATIENT
Start: 2022-11-28

## 2023-01-19 ENCOUNTER — TELEPHONE (OUTPATIENT)
Dept: CARDIOLOGY CLINIC | Age: 78
End: 2023-01-19

## 2023-01-19 DIAGNOSIS — I48.92 ATRIAL FLUTTER, UNSPECIFIED TYPE (HCC): ICD-10-CM

## 2023-01-19 RX ORDER — PROPAFENONE HYDROCHLORIDE 300 MG/1
TABLET, COATED ORAL
Qty: 90 TABLET | Refills: 3 | Status: SHIPPED | OUTPATIENT
Start: 2023-01-19

## 2023-01-19 NOTE — TELEPHONE ENCOUNTER
PT called stating his pulse is back up to 110 and states he took it upon himself to start taking propafenone 300 mg TID again. Pt needs new script due to running early. Is it ok to send new script for pt with increased dose.

## 2023-01-19 NOTE — TELEPHONE ENCOUNTER
Luis Jordan MD  You 11 minutes ago (12:44 PM)     TW  That is fine but keep in mind when he converts he needs to go back down on dose otherwise he will be too slow again

## 2023-02-03 ENCOUNTER — HOSPITAL ENCOUNTER (OUTPATIENT)
Dept: GENERAL RADIOLOGY | Facility: HOSPITAL | Age: 78
Discharge: HOME | End: 2023-02-03
Admitting: NURSE PRACTITIONER
Payer: MEDICARE

## 2023-02-03 ENCOUNTER — TRANSCRIBE ORDERS (OUTPATIENT)
Dept: ADMINISTRATIVE | Facility: HOSPITAL | Age: 78
End: 2023-02-03
Payer: MEDICARE

## 2023-02-03 DIAGNOSIS — M25.411 EFFUSION OF JOINT OF RIGHT SHOULDER: Primary | ICD-10-CM

## 2023-02-03 PROCEDURE — 73030 X-RAY EXAM OF SHOULDER: CPT

## 2023-02-06 DIAGNOSIS — I48.92 ATRIAL FLUTTER, UNSPECIFIED TYPE (HCC): ICD-10-CM

## 2023-02-06 RX ORDER — PROPAFENONE HYDROCHLORIDE 300 MG/1
TABLET, COATED ORAL
Qty: 90 TABLET | Refills: 3 | Status: SHIPPED | OUTPATIENT
Start: 2023-02-06

## 2023-02-07 ENCOUNTER — TRANSCRIBE ORDERS (OUTPATIENT)
Dept: ADMINISTRATIVE | Facility: HOSPITAL | Age: 78
End: 2023-02-07
Payer: MEDICARE

## 2023-02-07 DIAGNOSIS — M54.12 BRACHIAL NEURITIS: Primary | ICD-10-CM

## 2023-02-15 ENCOUNTER — HOSPITAL ENCOUNTER (OUTPATIENT)
Dept: MRI IMAGING | Facility: HOSPITAL | Age: 78
Discharge: HOME | End: 2023-02-15
Admitting: NURSE PRACTITIONER
Payer: MEDICARE

## 2023-02-15 ENCOUNTER — TRANSCRIBE ORDERS (OUTPATIENT)
Dept: ADMINISTRATIVE | Facility: HOSPITAL | Age: 78
End: 2023-02-15
Payer: MEDICARE

## 2023-02-15 DIAGNOSIS — M54.12 BRACHIAL NEURITIS: Primary | ICD-10-CM

## 2023-02-15 DIAGNOSIS — M54.12 BRACHIAL NEURITIS: ICD-10-CM

## 2023-02-15 PROCEDURE — 72141 MRI NECK SPINE W/O DYE: CPT

## 2023-03-07 ENCOUNTER — OFFICE VISIT (OUTPATIENT)
Dept: CARDIOLOGY CLINIC | Age: 78
End: 2023-03-07
Payer: MEDICARE

## 2023-03-07 VITALS
HEART RATE: 60 BPM | OXYGEN SATURATION: 99 % | WEIGHT: 210 LBS | HEIGHT: 72 IN | SYSTOLIC BLOOD PRESSURE: 118 MMHG | BODY MASS INDEX: 28.44 KG/M2 | DIASTOLIC BLOOD PRESSURE: 76 MMHG

## 2023-03-07 DIAGNOSIS — I48.19 PERSISTENT ATRIAL FIBRILLATION (HCC): Primary | ICD-10-CM

## 2023-03-07 PROCEDURE — 1036F TOBACCO NON-USER: CPT | Performed by: INTERNAL MEDICINE

## 2023-03-07 PROCEDURE — 99213 OFFICE O/P EST LOW 20 MIN: CPT | Performed by: INTERNAL MEDICINE

## 2023-03-07 PROCEDURE — 1123F ACP DISCUSS/DSCN MKR DOCD: CPT | Performed by: INTERNAL MEDICINE

## 2023-03-07 PROCEDURE — 93000 ELECTROCARDIOGRAM COMPLETE: CPT | Performed by: INTERNAL MEDICINE

## 2023-03-07 PROCEDURE — 3078F DIAST BP <80 MM HG: CPT | Performed by: INTERNAL MEDICINE

## 2023-03-07 PROCEDURE — G8417 CALC BMI ABV UP PARAM F/U: HCPCS | Performed by: INTERNAL MEDICINE

## 2023-03-07 PROCEDURE — G8428 CUR MEDS NOT DOCUMENT: HCPCS | Performed by: INTERNAL MEDICINE

## 2023-03-07 PROCEDURE — 3074F SYST BP LT 130 MM HG: CPT | Performed by: INTERNAL MEDICINE

## 2023-03-07 PROCEDURE — G8484 FLU IMMUNIZE NO ADMIN: HCPCS | Performed by: INTERNAL MEDICINE

## 2023-03-07 RX ORDER — GABAPENTIN 300 MG/1
300 CAPSULE ORAL 3 TIMES DAILY
COMMUNITY
Start: 2023-02-28

## 2023-03-07 RX ORDER — HYDROCODONE BITARTRATE AND ACETAMINOPHEN 7.5; 325 MG/1; MG/1
TABLET ORAL
COMMUNITY

## 2023-03-07 NOTE — PROGRESS NOTES
Jamie Lemus is a 68 y.o. male presents with paroxysmal atrial fibrillation. He had 1 episode in the past 6 months and took a propafenone and it resolved. He is no longer having problems with bradycardia. He is off anticoagulation and has a watchman. Review of Systems   Constitutional: Negative for fever, chills, diaphoresis, activity change, appetite change, fatigue and unexpected weight change. Eyes: Negative for photophobia, pain, redness and visual disturbance. Respiratory: Negative for apnea, cough, chest tightness, shortness of breath, wheezing and stridor. Cardiovascular: Negative for chest pain, palpitations and leg swelling. Gastrointestinal: Negative for abdominal distention. Genitourinary: Negative for dysuria, urgency and frequency. Musculoskeletal: Negative for myalgias, arthralgias and gait problem. Skin: Negative for color change, pallor, rash and wound. Neurological: Negative for dizziness, tremors, speech difficulty, weakness and numbness. Hematological: Does not bruise/bleed easily. Psychiatric/Behavioral: Negative. Social History     Socioeconomic History    Marital status:      Spouse name: Not on file    Number of children: Not on file    Years of education: Not on file    Highest education level: Not on file   Occupational History    Not on file   Tobacco Use    Smoking status: Former     Types: Cigarettes     Quit date: 1997     Years since quittin.5    Smokeless tobacco: Never   Vaping Use    Vaping Use: Never used   Substance and Sexual Activity    Alcohol use:  Yes     Alcohol/week: 0.0 standard drinks     Comment: minimal    Drug use: No    Sexual activity: Not on file   Other Topics Concern    Not on file   Social History Narrative    Not on file     Social Determinants of Health     Financial Resource Strain: Not on file   Food Insecurity: Not on file   Transportation Needs: Not on file   Physical Activity: Not on file   Stress: Not on file   Social Connections: Not on file   Intimate Partner Violence: Not on file   Housing Stability: Not on file       Allergies   Allergen Reactions    Doxycycline     Levofloxacin     Septra [Sulfamethoxazole-Trimethoprim]     Sulfa Antibiotics Itching         Current Outpatient Medications:     gabapentin (NEURONTIN) 300 MG capsule, 300 mg in the morning, at noon, and at bedtime. , Disp: , Rfl:     HYDROcodone-acetaminophen (NORCO) 7.5-325 MG per tablet, hydrocodone 7.5 mg-acetaminophen 325 mg tablet  Take 1 tablet every 12 hours by oral route as needed for 30 days. , Disp: , Rfl:     propafenone (RYTHMOL) 300 MG tablet, TAKE ONE TABLET BY MOUTH 3 TIMES DAILY. (Patient taking differently: TAKE ONE TABLET BY MOUTH 2 TIMES DAILY.  3 times a day PRN), Disp: 90 tablet, Rfl: 3    Cholecalciferol (VITAMIN D3) 50 MCG (2000 UT) CAPS, Take by mouth, Disp: , Rfl:     mupirocin (BACTROBAN) 2 % ointment, mupirocin 2 % topical ointment, Disp: , Rfl:     levothyroxine (SYNTHROID) 175 MCG tablet, Take 175 mcg by mouth Daily, Disp: , Rfl:     lisinopril (PRINIVIL;ZESTRIL) 10 MG tablet, Take 10 mg by mouth daily, Disp: , Rfl:     BIOTIN PO, Take 1 tablet by mouth daily, Disp: , Rfl:     alclomethasone (ACLOVATE) 0.05 % cream, Apply topically as needed , Disp: , Rfl:     sodium chloride (OCEAN NASAL SPRAY) 0.65 % nasal spray, 2 sprays by Nasal route as needed for Congestion, Disp: 1 Bottle, Rfl: 3    oxymetazoline (AFRIN NASAL SPRAY) 0.05 % nasal spray, Spray 2 sprays to affected nostril as needed for nosebleed, Disp: 1 Bottle, Rfl: 3    aspirin 81 MG tablet, Take 81 mg by mouth daily, Disp: , Rfl:     fenofibrate 160 MG tablet, Take 160 mg by mouth daily, Disp: , Rfl:     Multiple Vitamins-Minerals (THERAPEUTIC MULTIVITAMIN-MINERALS) tablet, Take 1 tablet by mouth daily, Disp: , Rfl:     terazosin (HYTRIN) 1 MG capsule, Take 1 mg by mouth nightly , Disp: , Rfl:     TESTOSTERONE IM, Inject 400 mg into the muscle once a week Every friday, Disp: , Rfl:     PE:  Vitals:    03/07/23 1112   BP: 118/76   Pulse: 60   SpO2: 99%   Weight: 210 lb (95.3 kg)   Height: 6' (1.829 m)       Estimated body mass index is 28.48 kg/m² as calculated from the following:    Height as of this encounter: 6' (1.829 m). Weight as of this encounter: 210 lb (95.3 kg). Constitutional: He is oriented to person, place, and time. He appears well-developed and well-nourished in no acute distress. Neck:  Neck supple without JVD present. No trachea deviation present. No thyromegaly present. Eyes:Conjunctivae and EOM are normal. Pupils equal and reactive to light. ENT:Hearing appears normal.conjunctiva and lids are normal, ears and nose appear normal.  Cardiovascular: Normal rate, S1-S2 regular rhythm, normal heart sounds. No murmur ascultated. No gallop and no friction rub. No carotid bruits. No peripheral edema. Pulmonary/Chest:  Lungs clear to auscultation bilaterally without evidence of respiratory distress. He without wheezes. He without rales or ronchi. Musculoskeletal: Normal range of motion. Gait is normal  Head is normocephalic and atraumatic. Skin: Skin is warm and dry without rash or pallor. Psychiatric:He is alert and oriented to person, place, and time. He has a normal mood and affect. His behavior is normal. Thought content normal.       Lab Results   Component Value Date/Time    CREATININE 1.9 07/30/2021 02:06 AM    CREATININE 1.8 07/29/2021 08:44 AM    CREATININE 1.9 12/23/2020 09:58 AM    HGB 14.5 07/30/2021 02:06 AM    HGB 15.1 07/29/2021 06:02 PM    HGB 15.1 07/29/2021 08:44 AM           ECG 03/07/23    Sinus rhythm with an incomplete left bundle         Assessment, Recommendations, & Plan:  68 y.o. male with atrial fibrillation maintaining sinus rhythm with minimal arrhythmia. His watchman was evaluated by transesophageal echo 2 years ago and found to be well-seated with no peridevice leak. His ejection fraction was 60%.   He is having no chest pain or shortness of breath and I will see him yearly. Disposition - RTC in 12 months or sooner if needed      Please do not hesitate to contact me for any questions or concerns. Dr. Rashad Pack.  Godwin  Electrophysiology and Cardiology  [facility] [place], Cardiology  [phone]

## 2023-06-15 PROBLEM — I48.0 PAROXYSMAL A-FIB (HCC): Status: ACTIVE | Noted: 2021-07-29

## 2023-06-22 ENCOUNTER — OFFICE VISIT (OUTPATIENT)
Dept: CARDIOLOGY CLINIC | Age: 78
End: 2023-06-22
Payer: MEDICARE

## 2023-06-22 VITALS
BODY MASS INDEX: 27.5 KG/M2 | WEIGHT: 203 LBS | SYSTOLIC BLOOD PRESSURE: 102 MMHG | HEART RATE: 45 BPM | HEIGHT: 72 IN | DIASTOLIC BLOOD PRESSURE: 74 MMHG

## 2023-06-22 DIAGNOSIS — I48.19 PERSISTENT ATRIAL FIBRILLATION (HCC): Primary | ICD-10-CM

## 2023-06-22 PROCEDURE — G8427 DOCREV CUR MEDS BY ELIG CLIN: HCPCS | Performed by: INTERNAL MEDICINE

## 2023-06-22 PROCEDURE — 93000 ELECTROCARDIOGRAM COMPLETE: CPT | Performed by: INTERNAL MEDICINE

## 2023-06-22 PROCEDURE — G8417 CALC BMI ABV UP PARAM F/U: HCPCS | Performed by: INTERNAL MEDICINE

## 2023-06-22 PROCEDURE — 1036F TOBACCO NON-USER: CPT | Performed by: INTERNAL MEDICINE

## 2023-06-22 PROCEDURE — 3074F SYST BP LT 130 MM HG: CPT | Performed by: INTERNAL MEDICINE

## 2023-06-22 PROCEDURE — 99213 OFFICE O/P EST LOW 20 MIN: CPT | Performed by: INTERNAL MEDICINE

## 2023-06-22 PROCEDURE — 3078F DIAST BP <80 MM HG: CPT | Performed by: INTERNAL MEDICINE

## 2023-06-22 PROCEDURE — 1123F ACP DISCUSS/DSCN MKR DOCD: CPT | Performed by: INTERNAL MEDICINE

## 2023-06-22 NOTE — PROGRESS NOTES
Ernestina Jiang is a 68 y.o. male presents with rapid atrial fibrillation status post cardioversion. He reports he feels much better      Review of Systems   Constitutional: Negative for fever, chills, diaphoresis, activity change, appetite change, fatigue and unexpected weight change. Eyes: Negative for photophobia, pain, redness and visual disturbance. Respiratory: Negative for apnea, cough, chest tightness, shortness of breath, wheezing and stridor. Cardiovascular: Negative for chest pain, palpitations and leg swelling. Gastrointestinal: Negative for abdominal distention. Genitourinary: Negative for dysuria, urgency and frequency. Musculoskeletal: Negative for myalgias, arthralgias and gait problem. Skin: Negative for color change, pallor, rash and wound. Neurological: Negative for dizziness, tremors, speech difficulty, weakness and numbness. Hematological: Does not bruise/bleed easily. Psychiatric/Behavioral: Negative. Social History     Socioeconomic History    Marital status:      Spouse name: Not on file    Number of children: Not on file    Years of education: Not on file    Highest education level: Not on file   Occupational History    Not on file   Tobacco Use    Smoking status: Former     Types: Cigarettes     Quit date: 1997     Years since quittin.8    Smokeless tobacco: Never   Vaping Use    Vaping Use: Never used   Substance and Sexual Activity    Alcohol use:  Yes     Alcohol/week: 0.0 standard drinks     Comment: minimal    Drug use: No    Sexual activity: Not on file   Other Topics Concern    Not on file   Social History Narrative    Not on file     Social Determinants of Health     Financial Resource Strain: Not on file   Food Insecurity: Not on file   Transportation Needs: Not on file   Physical Activity: Not on file   Stress: Not on file   Social Connections: Not on file   Intimate Partner Violence: Not on file   Housing Stability: Not on file Terbinafine Pregnancy And Lactation Text: This medication is Pregnancy Category B and is considered safe during pregnancy. It is also excreted in breast milk and breast feeding isn't recommended.

## 2023-09-25 DIAGNOSIS — I48.92 ATRIAL FLUTTER, UNSPECIFIED TYPE (HCC): ICD-10-CM

## 2023-09-25 RX ORDER — PROPAFENONE HYDROCHLORIDE 300 MG/1
TABLET, COATED ORAL
Qty: 90 TABLET | Refills: 3 | Status: SHIPPED | OUTPATIENT
Start: 2023-09-25

## 2024-02-01 DIAGNOSIS — I48.92 ATRIAL FLUTTER, UNSPECIFIED TYPE (HCC): ICD-10-CM

## 2024-02-01 RX ORDER — PROPAFENONE HYDROCHLORIDE 300 MG/1
TABLET, COATED ORAL
Qty: 90 TABLET | Refills: 5 | Status: SHIPPED | OUTPATIENT
Start: 2024-02-01

## 2024-02-21 DIAGNOSIS — I48.92 ATRIAL FLUTTER, UNSPECIFIED TYPE (HCC): ICD-10-CM

## 2024-02-22 RX ORDER — PROPAFENONE HYDROCHLORIDE 300 MG/1
300 TABLET, COATED ORAL EVERY 8 HOURS
Qty: 270 TABLET | Refills: 3 | Status: SHIPPED | OUTPATIENT
Start: 2024-02-22

## 2024-09-05 ENCOUNTER — TELEPHONE (OUTPATIENT)
Dept: CARDIOLOGY CLINIC | Age: 79
End: 2024-09-05

## 2024-09-05 NOTE — TELEPHONE ENCOUNTER
Patient is requesting a return call from a nurse in regards to weakness, and SOB that has gotten worse. Please return his call.    Thank you!

## 2024-09-05 NOTE — TELEPHONE ENCOUNTER
Have him keep the appt with drew ash and she can address with Dr. Connelly since he will be in the office that day as well. Thank you!

## 2024-09-05 NOTE — TELEPHONE ENCOUNTER
Spoke with patient and he advised that he has no energy and he is having SOB. He has been having this for about 2 months. HR is 50 and he states it has always been low for him since he is on propfenone 300 mg TID. He states he hasn't been having any AFIB. He went to the [employer] fair at [place] this past weekend and states he wasn't able to walk through it due to SOB. Once he rests it only takes a short time for him to recover. I made him the first available appt with Prakash Scott on 9/10. Does Dr. Connelly have anything sooner where he could see him?

## 2024-09-10 ENCOUNTER — OFFICE VISIT (OUTPATIENT)
Dept: CARDIOLOGY CLINIC | Age: 79
End: 2024-09-10
Payer: MEDICARE

## 2024-09-10 VITALS
OXYGEN SATURATION: 98 % | SYSTOLIC BLOOD PRESSURE: 120 MMHG | WEIGHT: 189 LBS | HEIGHT: 72 IN | BODY MASS INDEX: 25.6 KG/M2 | DIASTOLIC BLOOD PRESSURE: 62 MMHG | HEART RATE: 63 BPM

## 2024-09-10 DIAGNOSIS — I95.1 ORTHOSTATIC HYPOTENSION: ICD-10-CM

## 2024-09-10 DIAGNOSIS — I44.7 LBBB (LEFT BUNDLE BRANCH BLOCK): ICD-10-CM

## 2024-09-10 DIAGNOSIS — E78.1 HYPERTRIGLYCERIDEMIA: ICD-10-CM

## 2024-09-10 DIAGNOSIS — R94.31 ABNORMAL ELECTROCARDIOGRAPHY: ICD-10-CM

## 2024-09-10 DIAGNOSIS — Z95.818 PRESENCE OF WATCHMAN LEFT ATRIAL APPENDAGE CLOSURE DEVICE: ICD-10-CM

## 2024-09-10 DIAGNOSIS — R06.02 SHORTNESS OF BREATH: ICD-10-CM

## 2024-09-10 DIAGNOSIS — I48.0 PAF (PAROXYSMAL ATRIAL FIBRILLATION) (HCC): Primary | ICD-10-CM

## 2024-09-10 PROCEDURE — 1036F TOBACCO NON-USER: CPT | Performed by: NURSE PRACTITIONER

## 2024-09-10 PROCEDURE — 3078F DIAST BP <80 MM HG: CPT | Performed by: NURSE PRACTITIONER

## 2024-09-10 PROCEDURE — 3074F SYST BP LT 130 MM HG: CPT | Performed by: NURSE PRACTITIONER

## 2024-09-10 PROCEDURE — G8417 CALC BMI ABV UP PARAM F/U: HCPCS | Performed by: NURSE PRACTITIONER

## 2024-09-10 PROCEDURE — 99214 OFFICE O/P EST MOD 30 MIN: CPT | Performed by: NURSE PRACTITIONER

## 2024-09-10 PROCEDURE — 93000 ELECTROCARDIOGRAM COMPLETE: CPT | Performed by: NURSE PRACTITIONER

## 2024-09-10 PROCEDURE — 1123F ACP DISCUSS/DSCN MKR DOCD: CPT | Performed by: NURSE PRACTITIONER

## 2024-09-10 PROCEDURE — G8427 DOCREV CUR MEDS BY ELIG CLIN: HCPCS | Performed by: NURSE PRACTITIONER

## 2024-09-10 RX ORDER — ACETAMINOPHEN/DIPHENHYDRAMINE 500MG-25MG
1 TABLET ORAL NIGHTLY PRN
COMMUNITY

## 2024-10-01 ENCOUNTER — TELEPHONE (OUTPATIENT)
Dept: CARDIOLOGY CLINIC | Age: 79
End: 2024-10-01

## 2024-10-01 NOTE — TELEPHONE ENCOUNTER
Patient called stating was supposed to have dental surgery and his dentist office told him our office cleared him for dental work. Advised there is no evidence in his chart that our office has cleared him for surgery. There is no letter written to dental office. Patient states the dental office was asking if they can use epi. Advised his dental office will have to send cardiac clearance request to the office. He stated he would reach out to them.

## 2024-10-14 DIAGNOSIS — R06.02 SHORTNESS OF BREATH: Primary | ICD-10-CM

## 2024-10-14 DIAGNOSIS — R94.39 ABNORMAL CARDIOVASCULAR STRESS TEST: ICD-10-CM

## 2024-10-14 DIAGNOSIS — Z01.818 PRE-OP TESTING: Primary | ICD-10-CM

## 2024-10-15 DIAGNOSIS — Z01.818 PRE-OP TESTING: ICD-10-CM

## 2024-10-15 LAB
ALBUMIN SERPL-MCNC: 4 G/DL (ref 3.5–5.2)
ALP SERPL-CCNC: 42 U/L (ref 40–129)
ALT SERPL-CCNC: 13 U/L (ref 5–41)
ANION GAP SERPL CALCULATED.3IONS-SCNC: 13 MMOL/L (ref 7–19)
AST SERPL-CCNC: 22 U/L (ref 5–40)
BILIRUB SERPL-MCNC: 0.5 MG/DL (ref 0.2–1.2)
BUN SERPL-MCNC: 29 MG/DL (ref 8–23)
CALCIUM SERPL-MCNC: 9.8 MG/DL (ref 8.8–10.2)
CHLORIDE SERPL-SCNC: 104 MMOL/L (ref 98–111)
CO2 SERPL-SCNC: 24 MMOL/L (ref 22–29)
CREAT SERPL-MCNC: 2 MG/DL (ref 0.7–1.2)
ERYTHROCYTE [DISTWIDTH] IN BLOOD BY AUTOMATED COUNT: 13.4 % (ref 11.5–14.5)
GLUCOSE SERPL-MCNC: 109 MG/DL (ref 70–99)
HCT VFR BLD AUTO: 48.6 % (ref 42–52)
HGB BLD-MCNC: 15 G/DL (ref 14–18)
MCH RBC QN AUTO: 30.1 PG (ref 27–31)
MCHC RBC AUTO-ENTMCNC: 30.9 G/DL (ref 33–37)
MCV RBC AUTO: 97.4 FL (ref 80–94)
PLATELET # BLD AUTO: 206 K/UL (ref 130–400)
PMV BLD AUTO: 9.6 FL (ref 9.4–12.4)
POTASSIUM SERPL-SCNC: 4.3 MMOL/L (ref 3.5–5)
PROT SERPL-MCNC: 6.5 G/DL (ref 6.4–8.3)
RBC # BLD AUTO: 4.99 M/UL (ref 4.7–6.1)
SODIUM SERPL-SCNC: 141 MMOL/L (ref 136–145)
WBC # BLD AUTO: 5.3 K/UL (ref 4.8–10.8)

## 2024-10-27 NOTE — H&P
Stress Test: A pharmacological stress test was performed using regadenoson (Lexiscan). The patient reported dyspnea during the stress test. Symptoms began during stress and ended during stress. The patient reached the end of the protocol.    Resting ECG: The ECG shows sinus bradycardia. ECG demonstrates first-degree AV block and interventricular conduction delay. The ECG shows premature atrial contractions.    Stress ECG: Arrhythmias during stress: frequent PACs, PVCs. No significant ST changes noted. Arrhythmias during recovery: PACs, PVCs. The stress ECG was not diagnostic due to pharmacologic protocol.     Clinical correlation is advised.  Further evaluation if significant symptomatology.    Assessment and Recommendations:    This is a 79 y.o. 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  Mallampati score 2  Consent for moderate conscious sedation  ASA 3         Electronically signed by Martin Perez MD on 10/27/2024 at 5:43 PM        Thisdictation was generated by voice recognition computer software.  Although all attempts are made to edit the dictation for accuracy, there may be errors in the transcription that are not intended.

## 2024-10-29 ENCOUNTER — HOSPITAL ENCOUNTER (OUTPATIENT)
Age: 79
Setting detail: OUTPATIENT SURGERY
Discharge: HOME | End: 2024-10-29
Attending: INTERNAL MEDICINE | Admitting: INTERNAL MEDICINE
Payer: MEDICARE

## 2024-10-29 VITALS
OXYGEN SATURATION: 99 % | SYSTOLIC BLOOD PRESSURE: 121 MMHG | HEART RATE: 49 BPM | DIASTOLIC BLOOD PRESSURE: 67 MMHG | TEMPERATURE: 96.8 F | RESPIRATION RATE: 14 BRPM

## 2024-10-29 DIAGNOSIS — R94.39 ABNORMAL CARDIOVASCULAR STRESS TEST: ICD-10-CM

## 2024-10-29 DIAGNOSIS — I48.92 ATRIAL FLUTTER, UNSPECIFIED TYPE (HCC): ICD-10-CM

## 2024-10-29 LAB
EKG P AXIS: NORMAL DEGREES
EKG P-R INTERVAL: NORMAL MS
EKG Q-T INTERVAL: 506 MS
EKG QRS DURATION: 152 MS
EKG QTC CALCULATION (BAZETT): 485 MS
EKG T AXIS: 109 DEGREES

## 2024-10-29 PROCEDURE — 6370000000 HC RX 637 (ALT 250 FOR IP): Performed by: INTERNAL MEDICINE

## 2024-10-29 PROCEDURE — 99152 MOD SED SAME PHYS/QHP 5/>YRS: CPT | Performed by: INTERNAL MEDICINE

## 2024-10-29 PROCEDURE — 2500000003 HC RX 250 WO HCPCS: Performed by: INTERNAL MEDICINE

## 2024-10-29 PROCEDURE — 93458 L HRT ARTERY/VENTRICLE ANGIO: CPT | Performed by: INTERNAL MEDICINE

## 2024-10-29 PROCEDURE — C1769 GUIDE WIRE: HCPCS | Performed by: INTERNAL MEDICINE

## 2024-10-29 PROCEDURE — 93005 ELECTROCARDIOGRAM TRACING: CPT

## 2024-10-29 PROCEDURE — 2709999900 HC NON-CHARGEABLE SUPPLY: Performed by: INTERNAL MEDICINE

## 2024-10-29 PROCEDURE — C1894 INTRO/SHEATH, NON-LASER: HCPCS | Performed by: INTERNAL MEDICINE

## 2024-10-29 PROCEDURE — 2580000003 HC RX 258: Performed by: INTERNAL MEDICINE

## 2024-10-29 PROCEDURE — 7100000011 HC PHASE II RECOVERY - ADDTL 15 MIN: Performed by: INTERNAL MEDICINE

## 2024-10-29 PROCEDURE — 7100000010 HC PHASE II RECOVERY - FIRST 15 MIN: Performed by: INTERNAL MEDICINE

## 2024-10-29 PROCEDURE — 6360000002 HC RX W HCPCS: Performed by: INTERNAL MEDICINE

## 2024-10-29 RX ORDER — MIDAZOLAM HYDROCHLORIDE 1 MG/ML
INJECTION, SOLUTION INTRAMUSCULAR; INTRAVENOUS PRN
Status: DISCONTINUED | OUTPATIENT
Start: 2024-10-29 | End: 2024-10-29 | Stop reason: HOSPADM

## 2024-10-29 RX ORDER — SODIUM CHLORIDE 0.9 % (FLUSH) 0.9 %
5-40 SYRINGE (ML) INJECTION PRN
Status: DISCONTINUED | OUTPATIENT
Start: 2024-10-29 | End: 2024-10-29 | Stop reason: HOSPADM

## 2024-10-29 RX ORDER — HEPARIN SODIUM 1000 [USP'U]/ML
INJECTION, SOLUTION INTRAVENOUS; SUBCUTANEOUS PRN
Status: DISCONTINUED | OUTPATIENT
Start: 2024-10-29 | End: 2024-10-29 | Stop reason: HOSPADM

## 2024-10-29 RX ORDER — NITROGLYCERIN 0.4 MG/1
0.4 TABLET SUBLINGUAL EVERY 5 MIN PRN
Status: DISCONTINUED | OUTPATIENT
Start: 2024-10-29 | End: 2024-10-29 | Stop reason: HOSPADM

## 2024-10-29 RX ORDER — ACETAMINOPHEN 325 MG/1
650 TABLET ORAL EVERY 4 HOURS PRN
Status: DISCONTINUED | OUTPATIENT
Start: 2024-10-29 | End: 2024-10-29 | Stop reason: HOSPADM

## 2024-10-29 RX ORDER — IODIXANOL 270 MG/ML
INJECTION, SOLUTION INTRAVASCULAR PRN
Status: DISCONTINUED | OUTPATIENT
Start: 2024-10-29 | End: 2024-10-29 | Stop reason: HOSPADM

## 2024-10-29 RX ORDER — ROSUVASTATIN CALCIUM 10 MG/1
10 TABLET, COATED ORAL NIGHTLY
Qty: 90 TABLET | Refills: 3 | Status: SHIPPED | OUTPATIENT
Start: 2024-10-29

## 2024-10-29 RX ORDER — SODIUM CHLORIDE 9 MG/ML
INJECTION, SOLUTION INTRAVENOUS CONTINUOUS
Status: DISCONTINUED | OUTPATIENT
Start: 2024-10-29 | End: 2024-10-29 | Stop reason: HOSPADM

## 2024-10-29 RX ORDER — FENTANYL CITRATE 50 UG/ML
INJECTION, SOLUTION INTRAMUSCULAR; INTRAVENOUS PRN
Status: DISCONTINUED | OUTPATIENT
Start: 2024-10-29 | End: 2024-10-29 | Stop reason: HOSPADM

## 2024-10-29 RX ORDER — ASPIRIN 325 MG
325 TABLET ORAL ONCE
Status: COMPLETED | OUTPATIENT
Start: 2024-10-29 | End: 2024-10-29

## 2024-10-29 RX ORDER — NITROGLYCERIN 20 MG/100ML
INJECTION INTRAVENOUS PRN
Status: DISCONTINUED | OUTPATIENT
Start: 2024-10-29 | End: 2024-10-29 | Stop reason: HOSPADM

## 2024-10-29 RX ORDER — SODIUM CHLORIDE 0.9 % (FLUSH) 0.9 %
5-40 SYRINGE (ML) INJECTION EVERY 12 HOURS SCHEDULED
Status: DISCONTINUED | OUTPATIENT
Start: 2024-10-29 | End: 2024-10-29 | Stop reason: HOSPADM

## 2024-10-29 RX ORDER — SODIUM CHLORIDE 9 MG/ML
INJECTION, SOLUTION INTRAVENOUS PRN
Status: DISCONTINUED | OUTPATIENT
Start: 2024-10-29 | End: 2024-10-29 | Stop reason: HOSPADM

## 2024-10-29 RX ORDER — ONDANSETRON 2 MG/ML
4 INJECTION INTRAMUSCULAR; INTRAVENOUS EVERY 6 HOURS PRN
Status: DISCONTINUED | OUTPATIENT
Start: 2024-10-29 | End: 2024-10-29 | Stop reason: HOSPADM

## 2024-10-29 RX ADMIN — SODIUM BICARBONATE: 84 INJECTION, SOLUTION INTRAVENOUS at 10:35

## 2024-10-29 RX ADMIN — SODIUM CHLORIDE: 9 INJECTION, SOLUTION INTRAVENOUS at 09:45

## 2024-10-29 RX ADMIN — ASPIRIN 325 MG: 325 TABLET ORAL at 09:55

## 2024-10-29 NOTE — PROGRESS NOTES
Discharge instructions reviewed with patient and patient states understanding. Right radial site c/d/I. Patient discharged from cath holding with all belongings and AVS.  Electronically signed by Sarah Linares RN on 10/29/2024 at 5:48 PM

## 2024-10-29 NOTE — PROCEDURES
PROCEDURE NOTE  Date: 10/29/2024   Name: En Hilton  YOB: 1945    Procedures    Cardiac catheterization preliminary note:    Mild nonobstructive CAD with luminal regularities.  Mid LAD myocardial bridging.  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    Plan: Medical management.  Can proceed with mandibular surgery as clinically indicated.

## 2024-10-30 RX ORDER — PROPAFENONE HYDROCHLORIDE 300 MG/1
TABLET, COATED ORAL
Qty: 90 TABLET | Refills: 5 | Status: SHIPPED | OUTPATIENT
Start: 2024-10-30

## 2024-11-11 ENCOUNTER — TELEPHONE (OUTPATIENT)
Dept: CARDIOLOGY CLINIC | Age: 79
End: 2024-11-11

## 2024-11-11 NOTE — TELEPHONE ENCOUNTER
Date: TBD    Cardiologist: Godwin    Procedure: Dental Extraction & Implants    Surgeon: Bunny    Last Office Visit: 9/10/24  Reason for office visit and medical concerns addressed at this office visit: afib, htn, hyperlipidemia, h/o watchman, cad,     Testing Performed and Date of Service:  10/29/24 Cath Nonobstructive coronary arteries with mild luminal regularities.  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  Low LV end-diastolic pressures.    RCRI = 0.9   METs 4    Current Medications: testosterone, terazosin, crestor, rythmol, midodrine, levothyroxine, hydroxychloroquine, finasteride, fenofibrate, farxiga, aspirin    Is the patient currently taking an anticoagulant? If so, what is the diagnosis the patient has been given to warrant the need for the anticoagulant? none    Additional Notes: requesting cardiac clearance

## 2024-11-30 ENCOUNTER — HOSPITAL ENCOUNTER (EMERGENCY)
Age: 79
Discharge: HOME | End: 2024-11-30
Attending: EMERGENCY MEDICINE
Payer: MEDICARE

## 2024-11-30 ENCOUNTER — APPOINTMENT (OUTPATIENT)
Dept: GENERAL RADIOLOGY | Age: 79
End: 2024-11-30
Payer: MEDICARE

## 2024-11-30 VITALS
BODY MASS INDEX: 23.56 KG/M2 | HEART RATE: 106 BPM | WEIGHT: 178.57 LBS | RESPIRATION RATE: 15 BRPM | TEMPERATURE: 98.3 F | SYSTOLIC BLOOD PRESSURE: 112 MMHG | DIASTOLIC BLOOD PRESSURE: 79 MMHG | OXYGEN SATURATION: 100 %

## 2024-11-30 DIAGNOSIS — R00.2 PALPITATIONS: ICD-10-CM

## 2024-11-30 DIAGNOSIS — R53.1 GENERALIZED WEAKNESS: Primary | ICD-10-CM

## 2024-11-30 LAB
ALBUMIN SERPL-MCNC: 3.8 G/DL (ref 3.5–5.2)
ALP SERPL-CCNC: 43 U/L (ref 40–129)
ALT SERPL-CCNC: 7 U/L (ref 5–41)
ANION GAP SERPL CALCULATED.3IONS-SCNC: 12 MMOL/L (ref 7–19)
AST SERPL-CCNC: 18 U/L (ref 5–40)
B PARAP IS1001 DNA NPH QL NAA+NON-PROBE: NOT DETECTED
B PERT.PT PRMT NPH QL NAA+NON-PROBE: NOT DETECTED
BASOPHILS # BLD: 0 K/UL (ref 0–0.2)
BASOPHILS NFR BLD: 0.5 % (ref 0–1)
BILIRUB SERPL-MCNC: 0.6 MG/DL (ref 0.2–1.2)
BILIRUB UR QL STRIP: NEGATIVE
BUN SERPL-MCNC: 32 MG/DL (ref 8–23)
C PNEUM DNA NPH QL NAA+NON-PROBE: NOT DETECTED
CALCIUM SERPL-MCNC: 9.3 MG/DL (ref 8.8–10.2)
CHLORIDE SERPL-SCNC: 101 MMOL/L (ref 98–111)
CLARITY UR: CLEAR
CO2 SERPL-SCNC: 24 MMOL/L (ref 22–29)
COLOR UR: YELLOW
CREAT SERPL-MCNC: 1.7 MG/DL (ref 0.7–1.2)
EOSINOPHIL # BLD: 0.1 K/UL (ref 0–0.6)
EOSINOPHIL NFR BLD: 1.9 % (ref 0–5)
ERYTHROCYTE [DISTWIDTH] IN BLOOD BY AUTOMATED COUNT: 14.7 % (ref 11.5–14.5)
FLUAV RNA NPH QL NAA+NON-PROBE: NOT DETECTED
FLUBV RNA NPH QL NAA+NON-PROBE: NOT DETECTED
GLUCOSE SERPL-MCNC: 100 MG/DL (ref 70–99)
GLUCOSE UR STRIP.AUTO-MCNC: 100 MG/DL
HADV DNA NPH QL NAA+NON-PROBE: NOT DETECTED
HCOV 229E RNA NPH QL NAA+NON-PROBE: NOT DETECTED
HCOV HKU1 RNA NPH QL NAA+NON-PROBE: NOT DETECTED
HCOV NL63 RNA NPH QL NAA+NON-PROBE: NOT DETECTED
HCOV OC43 RNA NPH QL NAA+NON-PROBE: NOT DETECTED
HCT VFR BLD AUTO: 40.3 % (ref 42–52)
HGB BLD-MCNC: 12.9 G/DL (ref 14–18)
HGB UR STRIP.AUTO-MCNC: NEGATIVE MG/L
HMPV RNA NPH QL NAA+NON-PROBE: NOT DETECTED
HPIV1 RNA NPH QL NAA+NON-PROBE: NOT DETECTED
HPIV2 RNA NPH QL NAA+NON-PROBE: NOT DETECTED
HPIV3 RNA NPH QL NAA+NON-PROBE: NOT DETECTED
HPIV4 RNA NPH QL NAA+NON-PROBE: NOT DETECTED
IMM GRANULOCYTES # BLD: 0 K/UL
KETONES UR STRIP.AUTO-MCNC: NEGATIVE MG/DL
LEUKOCYTE ESTERASE UR QL STRIP.AUTO: NEGATIVE
LYMPHOCYTES # BLD: 0.7 K/UL (ref 1.1–4.5)
LYMPHOCYTES NFR BLD: 12.2 % (ref 20–40)
M PNEUMO DNA NPH QL NAA+NON-PROBE: NOT DETECTED
MAGNESIUM SERPL-MCNC: 2 MG/DL (ref 1.6–2.4)
MCH RBC QN AUTO: 31.5 PG (ref 27–31)
MCHC RBC AUTO-ENTMCNC: 32 G/DL (ref 33–37)
MCV RBC AUTO: 98.3 FL (ref 80–94)
MONOCYTES # BLD: 0.5 K/UL (ref 0–0.9)
MONOCYTES NFR BLD: 8 % (ref 0–10)
NEUTROPHILS # BLD: 4.4 K/UL (ref 1.5–7.5)
NEUTS SEG NFR BLD: 77.1 % (ref 50–65)
NITRITE UR QL STRIP.AUTO: NEGATIVE
PH UR STRIP.AUTO: 6.5 [PH] (ref 5–8)
PLATELET # BLD AUTO: 246 K/UL (ref 130–400)
PMV BLD AUTO: 9.2 FL (ref 9.4–12.4)
POTASSIUM SERPL-SCNC: 4.3 MMOL/L (ref 3.5–5)
PROT SERPL-MCNC: 6.8 G/DL (ref 6.4–8.3)
PROT UR STRIP.AUTO-MCNC: NEGATIVE MG/DL
RBC # BLD AUTO: 4.1 M/UL (ref 4.7–6.1)
RSV RNA NPH QL NAA+NON-PROBE: NOT DETECTED
RV+EV RNA NPH QL NAA+NON-PROBE: NOT DETECTED
SARS-COV-2 RNA NPH QL NAA+NON-PROBE: NOT DETECTED
SODIUM SERPL-SCNC: 137 MMOL/L (ref 136–145)
SP GR UR STRIP.AUTO: 1.02 (ref 1–1.03)
TROPONIN, HIGH SENSITIVITY: 37 NG/L (ref 0–22)
TROPONIN, HIGH SENSITIVITY: 38 NG/L (ref 0–22)
UROBILINOGEN UR STRIP.AUTO-MCNC: 1 E.U./DL
WBC # BLD AUTO: 5.7 K/UL (ref 4.8–10.8)

## 2024-11-30 PROCEDURE — 93005 ELECTROCARDIOGRAM TRACING: CPT | Performed by: EMERGENCY MEDICINE

## 2024-11-30 PROCEDURE — 85025 COMPLETE CBC W/AUTO DIFF WBC: CPT

## 2024-11-30 PROCEDURE — 71045 X-RAY EXAM CHEST 1 VIEW: CPT

## 2024-11-30 PROCEDURE — 2580000003 HC RX 258: Performed by: EMERGENCY MEDICINE

## 2024-11-30 PROCEDURE — 96361 HYDRATE IV INFUSION ADD-ON: CPT

## 2024-11-30 PROCEDURE — 84484 ASSAY OF TROPONIN QUANT: CPT

## 2024-11-30 PROCEDURE — 36415 COLL VENOUS BLD VENIPUNCTURE: CPT

## 2024-11-30 PROCEDURE — 99285 EMERGENCY DEPT VISIT HI MDM: CPT

## 2024-11-30 PROCEDURE — 80053 COMPREHEN METABOLIC PANEL: CPT

## 2024-11-30 PROCEDURE — 83735 ASSAY OF MAGNESIUM: CPT

## 2024-11-30 PROCEDURE — 0202U NFCT DS 22 TRGT SARS-COV-2: CPT

## 2024-11-30 PROCEDURE — 96360 HYDRATION IV INFUSION INIT: CPT

## 2024-11-30 PROCEDURE — 81003 URINALYSIS AUTO W/O SCOPE: CPT

## 2024-11-30 RX ORDER — SODIUM CHLORIDE, SODIUM LACTATE, POTASSIUM CHLORIDE, AND CALCIUM CHLORIDE .6; .31; .03; .02 G/100ML; G/100ML; G/100ML; G/100ML
1000 INJECTION, SOLUTION INTRAVENOUS ONCE
Status: COMPLETED | OUTPATIENT
Start: 2024-11-30 | End: 2024-11-30

## 2024-11-30 RX ADMIN — SODIUM CHLORIDE, POTASSIUM CHLORIDE, SODIUM LACTATE AND CALCIUM CHLORIDE 1000 ML: 600; 310; 30; 20 INJECTION, SOLUTION INTRAVENOUS at 11:15

## 2024-11-30 ASSESSMENT — ENCOUNTER SYMPTOMS
DIARRHEA: 0
ABDOMINAL PAIN: 0
BACK PAIN: 0
NAUSEA: 0
VOMITING: 0
SHORTNESS OF BREATH: 0
COUGH: 0

## 2024-11-30 NOTE — ED PROVIDER NOTES
[facility] EMERGENCY DEPT  eMERGENCY dEPARTMENT eNCOUnter      Pt Name: En Hilton  MRN: [id number]  Birthdate 1945  Date of evaluation: 11/30/2024  Provider: DENYS BAZZI MD    CHIEF COMPLAINT       Chief Complaint   Patient presents with    Irregular Heart Beat     States heart rate was in the 130's at home and blood pressure was in the 80's ; hx afib (states that he had extensive dental surgery a few weeks ago and has been a liquid diet since then)         HISTORY OF PRESENT ILLNESS   (Location/Symptom, Timing/Onset,Context/Setting, Quality, Duration, Modifying Factors, Severity)  Note limiting factors.   En Hilton is a 79 y.o. male who presents to the emergency department for approximately 1 week of generalized weakness fatigue.  Patient underwent maxillary implant surgery November 8 and has been on a liquid diet since then.  States he has been trying to get enough intake but it is somewhat difficult.  Reports approximate 1 week of generalized weakness fatigue has felt occasionally lightheaded but not passed out.  For the past several days blood pressure has been in the 80s which she deals with chronically and was prior on 5 mg midodrine 3 times daily but recently had this increased to 10 mg.  This morning could feel his heart racing and heart rate was in the 130s.  Does have a prior history of paroxysmal atrial fibrillation not on anticoagulants secondary to prior Watchman procedure.  He denies any chest pain or shortness of breath.  No abdominal pain or GI symptoms.  No flank pain or UTI symptoms.    HPI    NursingNotes were reviewed.    REVIEW OF SYSTEMS    (2-9 systems for level 4, 10 or more for level 5)     Review of Systems   Constitutional:  Positive for fatigue. Negative for chills and fever.   Respiratory:  Negative for cough and shortness of breath.    Cardiovascular:  Negative for chest pain.   Gastrointestinal:  Negative for abdominal pain, diarrhea, nausea and vomiting.   Genitourinary:  Negative    rosuvastatin (CRESTOR) 10 MG tablet Take 1 tablet by mouth nightly, Disp-90 tablet, R-3Normal      diphenhydrAMINE-APAP, sleep, (TYLENOL PM EXTRA STRENGTH)  MG tablet Take 1 tablet by mouth nightly as needed for SleepHistorical Med      midodrine (PROAMATINE) 5 MG tablet Take 1 tablet by mouth 3 times daily as needed (Low Blood Pressure)Historical Med      FARXIGA 10 MG tablet DAWHistorical Med      finasteride (PROSCAR) 5 MG tablet finasteride 5 mg tabletHistorical Med      hydroxychloroquine (PLAQUENIL) 200 MG tablet Plaquenil 200 mg tablet   Take 1 tablet every day by oral route.Historical Med      Cholecalciferol (VITAMIN D3) 50 MCG (2000 UT) CAPS Take by mouthHistorical Med      mupirocin (BACTROBAN) 2 % ointment mupirocin 2 % topical ointment, Historical Med      levothyroxine (SYNTHROID) 175 MCG tablet Take 1 tablet by mouth DailyHistorical Med      lisinopril (PRINIVIL;ZESTRIL) 10 MG tablet Take 1 tablet by mouth dailyHistorical Med      BIOTIN PO Take 1 tablet by mouth dailyHistorical Med      alclomethasone (ACLOVATE) 0.05 % cream Apply topically as needed , Topical, PRN Starting Wed 11/27/2019, Historical Med      oxymetazoline (AFRIN NASAL SPRAY) 0.05 % nasal spray Spray 2 sprays to affected nostril as needed for nosebleed, Disp-1 Bottle, R-3Normal      aspirin 81 MG tablet Take 1 tablet by mouth dailyHistorical Med      fenofibrate 160 MG tablet Take 1 tablet by mouth dailyHistorical Med      Multiple Vitamins-Minerals (THERAPEUTIC MULTIVITAMIN-MINERALS) tablet Take 1 tablet by mouth daily      terazosin (HYTRIN) 1 MG capsule Take 1 capsule by mouth nightlyHistorical Med      TESTOSTERONE IM Inject 400 mg into the muscle every 14 days Every fridayHistorical Med             ALLERGIES     Doxycycline, Levofloxacin, Septra [sulfamethoxazole-trimethoprim], and Sulfa antibiotics    FAMILY HISTORY       Family History   Problem Relation Age of Onset    Heart Attack Father           SOCIAL HISTORY

## 2024-12-01 LAB
EKG P AXIS: 99 DEGREES
EKG P-R INTERVAL: 152 MS
EKG Q-T INTERVAL: 364 MS
EKG QRS DURATION: 172 MS
EKG QTC CALCULATION (BAZETT): 449 MS
EKG T AXIS: 120 DEGREES

## 2024-12-19 ENCOUNTER — OFFICE VISIT (OUTPATIENT)
Dept: CARDIOLOGY CLINIC | Age: 79
End: 2024-12-19
Payer: MEDICARE

## 2024-12-19 VITALS
SYSTOLIC BLOOD PRESSURE: 124 MMHG | HEIGHT: 73 IN | HEART RATE: 84 BPM | DIASTOLIC BLOOD PRESSURE: 72 MMHG | WEIGHT: 187 LBS | BODY MASS INDEX: 24.78 KG/M2

## 2024-12-19 DIAGNOSIS — I48.0 PAROXYSMAL A-FIB (HCC): Primary | ICD-10-CM

## 2024-12-19 PROCEDURE — 3078F DIAST BP <80 MM HG: CPT | Performed by: INTERNAL MEDICINE

## 2024-12-19 PROCEDURE — G8427 DOCREV CUR MEDS BY ELIG CLIN: HCPCS | Performed by: INTERNAL MEDICINE

## 2024-12-19 PROCEDURE — 99214 OFFICE O/P EST MOD 30 MIN: CPT | Performed by: INTERNAL MEDICINE

## 2024-12-19 PROCEDURE — 1123F ACP DISCUSS/DSCN MKR DOCD: CPT | Performed by: INTERNAL MEDICINE

## 2024-12-19 PROCEDURE — 1036F TOBACCO NON-USER: CPT | Performed by: INTERNAL MEDICINE

## 2024-12-19 PROCEDURE — G8420 CALC BMI NORM PARAMETERS: HCPCS | Performed by: INTERNAL MEDICINE

## 2024-12-19 PROCEDURE — 1159F MED LIST DOCD IN RCRD: CPT | Performed by: INTERNAL MEDICINE

## 2024-12-19 PROCEDURE — G8484 FLU IMMUNIZE NO ADMIN: HCPCS | Performed by: INTERNAL MEDICINE

## 2024-12-19 PROCEDURE — 3074F SYST BP LT 130 MM HG: CPT | Performed by: INTERNAL MEDICINE

## 2024-12-19 RX ORDER — PANTOPRAZOLE SODIUM 40 MG/1
40 TABLET, DELAYED RELEASE ORAL DAILY
COMMUNITY
Start: 2024-07-25

## 2024-12-19 NOTE — PROGRESS NOTES
[facility] Card[facility]  [address]  [address]  [facility] [address]  [phone]    En Hilton is a 79 y.o. male presents with recurrent atrial fibrillation.  He had a rough few months after dental surgery and being on a liquid diet for a month.  He has had an increase in his atrial fibrillation.  He also has had worse problems with hypotension and sinus tachycardia.  Now he seems to be on the rebound and feeling much better.  His midodrine was recently increased.      Review of Systems   Constitutional: Negative for fever, chills, diaphoresis, activity change, appetite change, fatigue and unexpected weight change.   Eyes: Negative for photophobia, pain, redness and visual disturbance.   Respiratory: Negative for apnea, cough, chest tightness, shortness of breath, wheezing and stridor.    Cardiovascular: Negative for chest pain, palpitations and leg swelling.   Gastrointestinal: Negative for abdominal distention.   Genitourinary: Negative for dysuria, urgency and frequency.   Musculoskeletal: Negative for myalgias, arthralgias and gait problem.   Skin: Negative for color change, pallor, rash and wound.   Neurological: Negative for dizziness, tremors, speech difficulty, weakness and numbness.   Hematological: Does not bruise/bleed easily.   Psychiatric/Behavioral: Negative.          Social History     Socioeconomic History    Marital status:      Spouse name: Not on file    Number of children: Not on file    Years of education: Not on file    Highest education level: Not on file   Occupational History    Not on file   Tobacco Use    Smoking status: Former     Current packs/day: 0.00     Types: Cigarettes     Quit date: 1997     Years since quittin.3    Smokeless tobacco: Never   Vaping Use    Vaping status: Never Used   Substance and Sexual Activity    Alcohol use: Yes     Alcohol/week: 0.0 standard drinks of alcohol     Comment: minimal    Drug use: No    Sexual activity: Not on file   Other

## 2025-01-03 DIAGNOSIS — Z01.818 PRE-OP TESTING: ICD-10-CM

## 2025-01-03 DIAGNOSIS — I48.0 PAF (PAROXYSMAL ATRIAL FIBRILLATION) (HCC): Primary | ICD-10-CM

## (undated) DEVICE — CATHETER DIAG 5FR L110CM LUMN ID0.047IN PGTL 6 SIDE H HUB

## (undated) DEVICE — KIT MFLD ISOLATN NACL CNTRST PRT TBNG SPIK W/ PRSS TRNSDUC

## (undated) DEVICE — SYSTEM GWIRE L260CM DIA0035IN STD STEER HYDROPHOBIC STR TIP

## (undated) DEVICE — KIT ANGIO W/ AT P65 PREM HND CTRL FOR CNTRST DEL ANGIOTOUCH

## (undated) DEVICE — Device

## (undated) DEVICE — GLIDESHEATH SS KIT HYDROPHILIC COATED INTRODUCER SHEATH: Brand: GLIDESHEATH

## (undated) DEVICE — RADIFOCUS OPTITORQUE ANGIOGRAPHIC CATHETER: Brand: OPTITORQUE

## (undated) DEVICE — CATHETER DIAG 6FR L100CM LUMN ID0.056IN JR4 CRV 0 SIDE H

## (undated) DEVICE — Device: Brand: NOMOLINE™ LH ADULT NASAL CO2 CANNULA WITH O2 4M

## (undated) DEVICE — GUIDEWIRE VASC L260CM DIA0038IN TIP L3MM PTFE J TIP FIX COR

## (undated) DEVICE — TR BAND RADIAL ARTERY COMPRESSION DEVICE: Brand: TR BAND